# Patient Record
Sex: MALE | Race: WHITE | NOT HISPANIC OR LATINO | Employment: OTHER | URBAN - METROPOLITAN AREA
[De-identification: names, ages, dates, MRNs, and addresses within clinical notes are randomized per-mention and may not be internally consistent; named-entity substitution may affect disease eponyms.]

---

## 2018-04-18 ENCOUNTER — TELEPHONE (OUTPATIENT)
Dept: SURGERY | Facility: CLINIC | Age: 68
End: 2018-04-18

## 2018-04-18 NOTE — TELEPHONE ENCOUNTER
----- Message from Adalgisa Ramos sent at 4/18/2018 12:31 PM CDT -----  Contact: Sim pt wife#[phone]  She states that pt normally have to do a clean out for appts. I did inform her that prep was not required but she wants to speak with nurse about it. Please call

## 2018-04-20 DIAGNOSIS — Z86.010 HISTORY OF COLON POLYPS: Primary | ICD-10-CM

## 2018-04-24 ENCOUNTER — TELEPHONE (OUTPATIENT)
Dept: ENDOSCOPY | Facility: HOSPITAL | Age: 68
End: 2018-04-24

## 2018-04-24 RX ORDER — AMLODIPINE AND BENAZEPRIL HYDROCHLORIDE 10; 40 MG/1; MG/1
1 CAPSULE ORAL DAILY
COMMUNITY
End: 2019-06-06 | Stop reason: SDUPTHER

## 2018-04-24 RX ORDER — ATORVASTATIN CALCIUM 20 MG/1
20 TABLET, FILM COATED ORAL NIGHTLY
COMMUNITY
End: 2019-06-06 | Stop reason: SDUPTHER

## 2018-04-24 RX ORDER — UBIDECARENONE 30 MG
30 CAPSULE ORAL NIGHTLY
COMMUNITY
End: 2020-01-02

## 2018-04-24 RX ORDER — ASPIRIN 81 MG/1
81 TABLET ORAL DAILY
COMMUNITY
End: 2019-06-06 | Stop reason: SDUPTHER

## 2018-05-02 ENCOUNTER — TELEPHONE (OUTPATIENT)
Dept: SURGERY | Facility: CLINIC | Age: 68
End: 2018-05-02

## 2018-05-02 NOTE — TELEPHONE ENCOUNTER
Spoke with patient. States has pasty stools so good hygiene is difficult. Encouraged high fiber diet to promote  formed stools.  Instructed to call back in one week with an update. Colonoscopy scheduled in June 2018.

## 2018-05-02 NOTE — TELEPHONE ENCOUNTER
----- Message from Vijaya Boudreaux sent at 5/2/2018  1:40 PM CDT -----  Contact: Self - [phone]  Needs Medical Advice    Who Called: Pt   Symptoms (please be specific):  Contentiously wiping after using the restroom- rectum area raw from all the wiping- soft pasty stool    How long has patient had these symptoms: 5 or 6 days   Pharmacy name and phone # if needed:    Communication Preference Contact pt at [phone]  Additional Information:

## 2018-06-21 ENCOUNTER — ANESTHESIA EVENT (OUTPATIENT)
Dept: ENDOSCOPY | Facility: HOSPITAL | Age: 68
End: 2018-06-21
Payer: COMMERCIAL

## 2018-06-21 ENCOUNTER — ANESTHESIA (OUTPATIENT)
Dept: ENDOSCOPY | Facility: HOSPITAL | Age: 68
End: 2018-06-21
Payer: COMMERCIAL

## 2018-06-21 ENCOUNTER — SURGERY (OUTPATIENT)
Age: 68
End: 2018-06-21

## 2018-06-21 ENCOUNTER — HOSPITAL ENCOUNTER (OUTPATIENT)
Facility: HOSPITAL | Age: 68
Discharge: HOME | End: 2018-06-21
Attending: COLON & RECTAL SURGERY | Admitting: COLON & RECTAL SURGERY
Payer: COMMERCIAL

## 2018-06-21 VITALS
BODY MASS INDEX: 29.35 KG/M2 | TEMPERATURE: 99 F | RESPIRATION RATE: 17 BRPM | OXYGEN SATURATION: 98 % | WEIGHT: 205 LBS | DIASTOLIC BLOOD PRESSURE: 88 MMHG | HEIGHT: 70 IN | SYSTOLIC BLOOD PRESSURE: 123 MMHG | HEART RATE: 69 BPM

## 2018-06-21 DIAGNOSIS — Z12.11 SCREENING FOR COLON CANCER: ICD-10-CM

## 2018-06-21 PROCEDURE — 37000009 HC ANESTHESIA EA ADD 15 MINS: Performed by: COLON & RECTAL SURGERY

## 2018-06-21 PROCEDURE — G0105 COLORECTAL SCRN; HI RISK IND: HCPCS | Performed by: COLON & RECTAL SURGERY

## 2018-06-21 PROCEDURE — 25000003 PHARM REV CODE 250: Performed by: NURSE ANESTHETIST, CERTIFIED REGISTERED

## 2018-06-21 PROCEDURE — 63600175 PHARM REV CODE 636 W HCPCS: Performed by: NURSE ANESTHETIST, CERTIFIED REGISTERED

## 2018-06-21 PROCEDURE — E9220 PRA ENDO ANESTHESIA: HCPCS | Mod: ,,, | Performed by: NURSE ANESTHETIST, CERTIFIED REGISTERED

## 2018-06-21 PROCEDURE — G0105 COLORECTAL SCRN; HI RISK IND: HCPCS | Mod: ,,, | Performed by: COLON & RECTAL SURGERY

## 2018-06-21 PROCEDURE — 37000008 HC ANESTHESIA 1ST 15 MINUTES: Performed by: COLON & RECTAL SURGERY

## 2018-06-21 PROCEDURE — 25000003 PHARM REV CODE 250: Performed by: NURSE PRACTITIONER

## 2018-06-21 RX ORDER — LIDOCAINE HCL/PF 100 MG/5ML
SYRINGE (ML) INTRAVENOUS
Status: DISCONTINUED | OUTPATIENT
Start: 2018-06-21 | End: 2018-06-21

## 2018-06-21 RX ORDER — PROPOFOL 10 MG/ML
VIAL (ML) INTRAVENOUS
Status: DISCONTINUED | OUTPATIENT
Start: 2018-06-21 | End: 2018-06-21

## 2018-06-21 RX ORDER — SODIUM CHLORIDE 9 MG/ML
INJECTION, SOLUTION INTRAVENOUS CONTINUOUS
Status: DISCONTINUED | OUTPATIENT
Start: 2018-06-21 | End: 2018-06-21 | Stop reason: HOSPADM

## 2018-06-21 RX ORDER — GLYCOPYRROLATE 0.2 MG/ML
INJECTION INTRAMUSCULAR; INTRAVENOUS
Status: DISCONTINUED | OUTPATIENT
Start: 2018-06-21 | End: 2018-06-21

## 2018-06-21 RX ORDER — PROPOFOL 10 MG/ML
VIAL (ML) INTRAVENOUS CONTINUOUS PRN
Status: DISCONTINUED | OUTPATIENT
Start: 2018-06-21 | End: 2018-06-21

## 2018-06-21 RX ADMIN — GLYCOPYRROLATE 0.2 MG: 0.2 INJECTION, SOLUTION INTRAMUSCULAR; INTRAVENOUS at 09:06

## 2018-06-21 RX ADMIN — PROPOFOL 150 MCG/KG/MIN: 10 INJECTION, EMULSION INTRAVENOUS at 09:06

## 2018-06-21 RX ADMIN — PROPOFOL 20 MG: 10 INJECTION, EMULSION INTRAVENOUS at 09:06

## 2018-06-21 RX ADMIN — SODIUM CHLORIDE: 0.9 INJECTION, SOLUTION INTRAVENOUS at 09:06

## 2018-06-21 RX ADMIN — PROPOFOL 70 MG: 10 INJECTION, EMULSION INTRAVENOUS at 09:06

## 2018-06-21 RX ADMIN — LIDOCAINE HYDROCHLORIDE 40 MG: 20 INJECTION, SOLUTION INTRAVENOUS at 09:06

## 2018-06-21 NOTE — ANESTHESIA POSTPROCEDURE EVALUATION
"Anesthesia Post Evaluation    Patient: Rikki Mello    Procedure(s) Performed: Procedure(s) (LRB):  COLONOSCOPY (N/A)    Final Anesthesia Type: general  Patient location during evaluation: GI PACU  Patient participation: Yes- Able to Participate  Level of consciousness: awake and alert  Post-procedure vital signs: reviewed and stable  Pain management: adequate  Airway patency: patent  PONV status at discharge: No PONV  Anesthetic complications: no      Cardiovascular status: blood pressure returned to baseline  Respiratory status: unassisted  Hydration status: euvolemic  Follow-up not needed.        Visit Vitals  /88   Pulse 69   Temp 37.1 °C (98.7 °F)   Resp 17   Ht 5' 10" (1.778 m)   Wt 93 kg (205 lb)   SpO2 98%   BMI 29.41 kg/m²       Pain/Tawny Score: Pain Assessment Performed: Yes (6/21/2018 10:07 AM)  Presence of Pain: denies (6/21/2018  9:29 AM)      "

## 2018-06-21 NOTE — PROVATION PATIENT INSTRUCTIONS
Discharge Summary/Instructions after an Endoscopic Procedure  Patient Name: Rikki Mello  Patient MRN: [id number]  Patient YOB: 1950 Thursday, June 21, 2018  Medhat Ponce MD  RESTRICTIONS:  During your procedure today, you received medications for sedation.  These   medications may affect your judgment, balance and coordination.  Therefore,   for 24 hours, you have the following restrictions:   - DO NOT drive a car, operate machinery, make legal/financial decisions,   sign important papers or drink alcohol.    ACTIVITY:  Today: no heavy lifting, straining or running due to procedural   sedation/anesthesia.  The following day: return to full activity including work.  DIET:  Eat and drink normally unless instructed otherwise.     TREATMENT FOR COMMON SIDE EFFECTS:  - Mild abdominal pain, nausea, belching, bloating or excessive gas:  rest,   eat lightly and use a heating pad.  - Sore Throat: treat with throat lozenges and/or gargle with warm salt   water.  - Because air was used during the procedure, expelling large amounts of air   from your rectum or belching is normal.  - If a bowel prep was taken, you may not have a bowel movement for 1-3 days.    This is normal.  SYMPTOMS TO WATCH FOR AND REPORT TO YOUR PHYSICIAN:  1. Abdominal pain or bloating, other than gas cramps.  2. Chest pain.  3. Back pain.  4. Signs of infection such as: chills or fever occurring within 24 hours   after the procedure.  5. Rectal bleeding, which would show as bright red, maroon, or black stools.   (A tablespoon of blood from the rectum is not serious, especially if   hemorrhoids are present.)  6. Vomiting.  7. Weakness or dizziness.  GO DIRECTLY TO THE NEAREST EMERGENCY ROOM IF YOU HAVE ANY OF THE FOLLOWING:      Difficulty breathing              Chills and/or fever over 101 F   Persistent vomiting and/or vomiting blood   Severe abdominal pain   Severe chest pain   Black, tarry stools   Bleeding- more than one  tablespoon   Any other symptom or condition that you feel may need urgent attention  Your doctor recommends these additional instructions:  If any biopsies were taken, your doctors clinic will contact you in 1 to 2   weeks with any results.  - Patient has a contact number available for emergencies.  The signs and   symptoms of potential delayed complications were discussed with the   patient.  Return to normal activities tomorrow.  Written discharge   instructions were provided to the patient.   - Resume regular diet indefinitely.   - Continue present medications.   - Repeat colonoscopy in 5 years for surveillance.   - Discharge patient to home (ambulatory).   - Schedule TAE/TAMIS in OR  For questions, problems or results please call your physician - Medhat Ponce MD at Work:  [phone].  [facility], EMERGENCY ROOM PHONE NUMBER: [phone]  IF A COMPLICATION OR EMERGENCY SITUATION ARISES AND YOU ARE UNABLE TO REACH   YOUR PHYSICIAN - GO DIRECTLY TO THE EMERGENCY ROOM.  Medhat Ponce MD  6/21/2018 10:07:42 AM  This report has been verified and signed electronically.  [employer]ATION

## 2018-06-21 NOTE — TRANSFER OF CARE
"Anesthesia Transfer of Care Note    Patient: Rikki Mello    Procedure(s) Performed: Procedure(s) (LRB):  COLONOSCOPY (N/A)    Patient location: PACU    Anesthesia Type: general    Transport from OR: Transported from OR on room air with adequate spontaneous ventilation    Post pain: adequate analgesia    Post assessment: no apparent anesthetic complications and tolerated procedure well    Post vital signs: stable    Level of consciousness: sedated    Nausea/Vomiting: no nausea/vomiting    Complications: none    Transfer of care protocol was followed      Last vitals:   Visit Vitals  BP (!) 150/72 (BP Location: Left arm, Patient Position: Sitting)   Pulse 82   Temp 36.9 °C (98.4 °F) (Temporal)   Resp 18   Ht 5' 10" (1.778 m)   Wt 93 kg (205 lb)   SpO2 98%   BMI 29.41 kg/m²     "

## 2018-06-21 NOTE — H&P
COLONOSCOPY HISTORY AND PE    Procedure : Colonoscopy      INDICATIONS: family history of colon polyps    Family Hx of CRC: none    Last Colonoscopy:  2013  Findings: normal       Past Medical History:   Diagnosis Date    BPH (benign prostatic hyperplasia)     Inguinal hernia unilateral, non-recurrent      Sedation Problems: NO  No family history on file.  Fam Hx of Sedation Problems: NO  Social History     Social History    Marital status:      Spouse name: N/A    Number of children: N/A    Years of education: N/A     Occupational History    Not on file.     Social History Main Topics    Smoking status: Never Smoker    Smokeless tobacco: Not on file    Alcohol use Yes      Comment: once a month    Drug use: No    Sexual activity: Not on file     Other Topics Concern    Not on file     Social History Narrative    No narrative on file       Review of Systems - Negative except   Respiratory ROS: no dyspnea  Cardiovascular ROS: no exertional chest pain  Gastrointestinal ROS: NO abdominal discomfort,  NO rectal bleeding  Musculoskeletal ROS: no muscular pain  Neurological ROS: no recent stroke    Physical Exam:  There were no vitals taken for this visit.  General: no distress  Head: normocephalic  Mallampati Score   Neck: supple, symmetrical, trachea midline  Lungs:  normal respiratory effort  Heart: no murmur  Abdomen: soft, non-tender non-distented; bowel sounds normal; no masses,  no organomegaly  Extremities: no cyanosis or edema, or clubbing    ASA:  II    PLAN  COLONOSCOPY.    SedationPlan :MAC    The details of the procedure, the possible need for biopsy or polypectomy and the potential risks including bleeding, perforation, missed polyps were discussed in detail.

## 2018-06-21 NOTE — ANESTHESIA PREPROCEDURE EVALUATION
06/21/2018  Rikki Mello is a 68 y.o., male.  Past Medical History:   Diagnosis Date    BPH (benign prostatic hyperplasia)     Inguinal hernia unilateral, non-recurrent      Past Surgical History:   Procedure Laterality Date    HERNIA REPAIR      TONSILLECTOMY      TUMOR EXCISION      rectum         Anesthesia Evaluation    I have reviewed the Patient Summary Reports.    I have reviewed the Nursing Notes.   I have reviewed the Medications.     Review of Systems  Anesthesia Hx:  No problems with previous Anesthesia  Neg history of prior surgery. Denies Family Hx of Anesthesia complications.   Denies Personal Hx of Anesthesia complications.   Hematology/Oncology:  Hematology Normal   Oncology Normal     EENT/Dental:EENT/Dental Normal   Cardiovascular:   Exercise tolerance: good Hypertension, well controlled    Pulmonary:  Pulmonary Normal    Renal/:  Renal/ Normal     Hepatic/GI:  Hepatic/GI Normal Bowel Prep.    Musculoskeletal:  Musculoskeletal Normal    Neurological:  Neurology Normal    Endocrine:  Endocrine Normal    Dermatological:  Skin Normal    Psych:  Psychiatric Normal           Physical Exam  General:  Well nourished    Airway/Jaw/Neck:  Airway Findings: Mouth Opening: Normal Tongue: Normal  Mallampati: II  TM Distance: Normal, at least 6 cm        Eyes/Ears/Nose:  EYES/EARS/NOSE FINDINGS: Normal   Dental:  Dental Findings:   Chest/Lungs:  Chest/Lungs Clear    Heart/Vascular:  Heart Findings: Normal Heart murmur: negative Vascular Findings: Normal    Abdomen:  Abdomen Findings: Normal    Musculoskeletal:  Musculoskeletal Findings: Normal   Skin:  Skin Findings: Normal    Mental Status:  Mental Status Findings: Normal        Anesthesia Plan  Type of Anesthesia, risks & benefits discussed:  Anesthesia Type:  general  Patient's Preference: General  Intra-op Monitoring Plan: standard ASA  monitors  Intra-op Monitoring Plan Comments:   Post Op Pain Control Plan:   Post Op Pain Control Plan Comments:   Induction:   IV  Beta Blocker:  Patient is not currently on a Beta-Blocker (No further documentation required).       Informed Consent: Patient understands risks and agrees with Anesthesia plan.  Questions answered. Anesthesia consent signed with patient.  ASA Score: 2     Day of Surgery Review of History & Physical: I have interviewed and examined the patient. I have reviewed the patient's H&P dated:  There are no significant changes.  H&P update referred to the surgeon.         Ready For Surgery From Anesthesia Perspective.

## 2018-06-25 ENCOUNTER — TELEPHONE (OUTPATIENT)
Dept: SURGERY | Facility: CLINIC | Age: 68
End: 2018-06-25

## 2018-06-25 NOTE — TELEPHONE ENCOUNTER
Spoke with patient's wife and informed her that the patient's surgery is scheduled for 7/16 and his appointment is scheduled for 7/2.

## 2018-06-25 NOTE — TELEPHONE ENCOUNTER
----- Message from Danita Neff sent at 6/25/2018  3:41 PM CDT -----  Contact: Pt:[phone]  .Patient Returning Call from [facility]    Who Left Message for Patient:Candy De Los Santos RN  Communication Preference:Pt:[phone]  Additional Information:

## 2018-06-26 DIAGNOSIS — K62.1 RECTAL POLYP: Primary | ICD-10-CM

## 2018-06-28 ENCOUNTER — TELEPHONE (OUTPATIENT)
Dept: ENDOSCOPY | Facility: HOSPITAL | Age: 68
End: 2018-06-28

## 2018-06-28 ENCOUNTER — ANESTHESIA EVENT (OUTPATIENT)
Dept: SURGERY | Facility: HOSPITAL | Age: 68
End: 2018-06-28
Payer: COMMERCIAL

## 2018-06-28 DIAGNOSIS — Z01.818 PREOP TESTING: Primary | ICD-10-CM

## 2018-06-28 NOTE — ANESTHESIA PREPROCEDURE EVALUATION
" Anesthesia Assessment: Preoperative EQUATION    Planned Procedure: Procedure(s) (LRB):  TRANSANAL MINIMALLY INVASIVE SURGERY (TAMIS) (N/A)  Requested Anesthesia Type:General  Surgeon: Medhat Ponce MD  Service: Colon and Rectal  Known or anticipated Date of Surgery:7/17/2018    Surgeon notes: reviewed and Rectal polyp  Previous anesthesia records:6/21/18-Colonoscopy-General-no apparent anesthetic complications and tolerated procedure well    Last PCP note: within 1 month , outside [facility] , focused visit   Subspecialty notes: Cardiology: General, Urology  Tests already available:  No recent tests.      Plan:    Testing:  EKG & H/H      Patient  has previously scheduled Medical Appointment:Appointment on 7/2/18, prior to surgery date.    Navigation: Tests Scheduled. EKG & Lab-H&H on 7/2/18 @ 11:30a & 11:55a             Consults scheduled.Requested OS "Clearance" fro NP/PCP-[facility] in [place]-PENDING Callback-Update:Spoke to DAVIDE Miller-will fax "Clearance" ASAP-PENDING             Results will be tracked by Preop Clinic.   Plan reviewed with .              Ghazala Murphy RN  6/28/18    Addendum:OS "Clearance" still pending as of 7/2/18. Ghazala Murphy RN  7/2/18               Addendum:Received  OS NP/PCP-ONEIL Miller-[place]  "Clearance" & in [facility] in [facility].    [facility]  Anesthesia Pre-Operative Evaluation         Patient Name: Rikki Mello  YOB: 1950  MRN: [id number]    SUBJECTIVE:     Pre-operative evaluation for Procedure(s) (LRB):  TRANSANAL MINIMALLY INVASIVE SURGERY (TAMIS) (N/A)     07/09/2018    Rikki Mello is a 68 y.o. male w/ a significant PMHx of HTN presenting with a rectal villous tumor. 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    Patient now presents for the above procedure(s).      LDA: None documented.    Prev " airway: None documented.    Drips: None documented.    Patient Active Problem List   Diagnosis    Screening for colon cancer       Review of patient's allergies indicates:   Allergen Reactions    Codeine Itching       No current facility-administered medications for this encounter.     Current Outpatient Prescriptions:     amlodipine-benazepril (LOTREL) 10-40 mg per capsule, Take 1 capsule by mouth once daily., Disp: , Rfl:     aspirin (ECOTRIN) 81 MG EC tablet, Take 81 mg by mouth once daily. , Disp: , Rfl:     atorvastatin (LIPITOR) 20 MG tablet, Take 20 mg by mouth every evening., Disp: , Rfl:     co-enzyme Q-10 30 mg capsule, Take 30 mg by mouth every evening., Disp: , Rfl:     metroNIDAZOLE (FLAGYL) 500 MG tablet, On the day before your surgery, take 1 tablet (500 mg) by mouth at 1pm, 2pm and 11pm., Disp: 3 tablet, Rfl: 0    neomycin (MYCIFRADIN) 500 mg Tab, On the day before your surgery, take 2 tablets (1,000 mg) by mouth at 1pm, 2pm and 11pm., Disp: 6 tablet, Rfl: 0    No current facility-administered medications on file prior to encounter.      Current Outpatient Prescriptions on File Prior to Encounter   Medication Sig Dispense Refill    amlodipine-benazepril (LOTREL) 10-40 mg per capsule Take 1 capsule by mouth once daily.      aspirin (ECOTRIN) 81 MG EC tablet Take 81 mg by mouth once daily.       atorvastatin (LIPITOR) 20 MG tablet Take 20 mg by mouth every evening.      co-enzyme Q-10 30 mg capsule Take 30 mg by mouth every evening.         Past Surgical History:   Procedure Laterality Date    COLONOSCOPY N/A 6/21/2018    Procedure: COLONOSCOPY;  Surgeon: Medhat Ponce MD;  Location: [address]);  Service: Endoscopy;  Laterality: N/A;  miralax prep (past preps miralax can not tolerate other preps)    HERNIA REPAIR      TONSILLECTOMY      TUMOR EXCISION      rectum       Social History     Social History    Marital status:      Spouse name: N/A    Number of  children: N/A    Years of education: N/A     Occupational History    Not on file.     Social History Main Topics    Smoking status: Never Smoker    Smokeless tobacco: Never Used    Alcohol use Yes      Comment: once a month    Drug use: No    Sexual activity: Not on file     Other Topics Concern    Not on file     Social History Narrative    No narrative on file       OBJECTIVE:     Vital Signs Range (Last 24H):         Significant Labs:  Lab Results   Component Value Date    WBC 6.48 02/17/2012    HGB 15.3 07/02/2018    HCT 44.0 07/02/2018     02/17/2012     02/17/2012    K 4.0 02/17/2012     02/17/2012    CREATININE 0.9 02/17/2012    BUN 17 02/17/2012    CO2 25 02/17/2012    INR 0.9 07/09/2007       Diagnostic Studies: No relevant studies.    EKG:   Vent. Rate : 049 BPM     Atrial Rate : 049 BPM     P-R Int : 134 ms          QRS Dur : 092 ms      QT Int : 458 ms       P-R-T Axes : 074 028 038 degrees     QTc Int : 413 ms    Marked sinus bradycardia  Otherwise normal ECG  When compared with ECG of 17-FEB-2012 11:02,  No significant change was found    Confirmed by Delroy Dodson MD ([id number]) on 7/2/2018 12:13:31 PM    2D ECHO:  No results found for this or any previous visit.       ASSESSMENT/PLAN:         Anesthesia Evaluation    I have reviewed the Patient Summary Reports.     I have reviewed the Medications.     Review of Systems  Anesthesia Hx:  No problems with previous Anesthesia  Denies Family Hx of Anesthesia complications.   Denies Personal Hx of Anesthesia complications.   Social:  Social Alcohol Use, Non-Smoker    EENT/Dental:   Wears glasses/sinus issues/poor dental-chipped teeth   Cardiovascular:   Exercise tolerance: good Hypertension, well controlled Walking daily/ gardening/mows lawn   Hepatic/GI:   Rectal polyp   Dermatological:   Very dry skin       Physical Exam  General:  Well nourished    Airway/Jaw/Neck:  Airway Findings: Mouth Opening: Normal Tongue: Normal  General  Airway Assessment: Adult  Mallampati: II  Improves to I with phonation.  TM Distance: Normal, at least 6 cm  Jaw/Neck Findings:  Neck ROM: Normal ROM     Eyes/Ears/Nose:  EYES/EARS/NOSE FINDINGS: Normal   Dental:  Dental Findings:    Chest/Lungs:  Chest/Lungs Findings: Clear to auscultation, Normal Respiratory Rate     Heart/Vascular:  Heart Findings: Rate: Normal  Rhythm: Regular Rhythm     Abdomen:  Abdomen Findings: Normal    Musculoskeletal:  Musculoskeletal Findings: Normal   Skin:  Skin Findings: Normal    Mental Status:  Mental Status Findings:  Cooperative, Alert and Oriented     Ghazala Murphy RN  6/28/18    Anesthesia Plan  Type of Anesthesia, risks & benefits discussed:  Anesthesia Type:  general, MAC  Patient's Preference:   Intra-op Monitoring Plan: standard ASA monitors  Intra-op Monitoring Plan Comments:   Post Op Pain Control Plan: multimodal analgesia  Post Op Pain Control Plan Comments:   Induction:   IV  Beta Blocker:  Patient is not currently on a Beta-Blocker (No further documentation required).       Informed Consent: Patient understands risks and agrees with Anesthesia plan.  Questions answered. Anesthesia consent signed with patient.  ASA Score: 2     Day of Surgery Review of History & Physical: I have interviewed and examined the patient. I have reviewed the patient's H&P dated:            Ready For Surgery From Anesthesia Perspective.

## 2018-06-28 NOTE — PRE ADMISSION SCREENING
"Anesthesia Assessment: Preoperative EQUATION    Planned Procedure: Procedure(s) (LRB):  TRANSANAL MINIMALLY INVASIVE SURGERY (TAMIS) (N/A)  Requested Anesthesia Type:General  Surgeon: Medhat Ponce MD  Service: Colon and Rectal  Known or anticipated Date of Surgery:7/17/2018    Surgeon notes: reviewed and Rectal polyp  Previous anesthesia records:6/21/18-Colonoscopy-General-no apparent anesthetic complications and tolerated procedure well    Last PCP note: within 1 month , outside [facility] , focused visit   Subspecialty notes: Cardiology: General, Urology  Tests already available:  No recent tests.      Plan:    Testing:  EKG & H/H      Patient  has previously scheduled Medical Appointment:Appointment on 7/2/18, prior to surgery date.    Navigation: Tests Scheduled. EKG & Lab-H&H on 7/2/18 @ 11:30a & 11:55a             Consults scheduled.Requested OS "Clearance" fro NP[facility]P-[facility] [place], [place]-PENDING Callback-Update:Spoke to DAVIDE Miller-will fax "Clearance" ASAP-PENDING             Results will be tracked by Preop Clinic.   Plan reviewed with .              Ghazala Murphy, RN  6/28/18                "

## 2018-07-02 ENCOUNTER — HOSPITAL ENCOUNTER (OUTPATIENT)
Dept: CARDIOLOGY | Facility: CLINIC | Age: 68
Discharge: HOME | End: 2018-07-02
Payer: COMMERCIAL

## 2018-07-02 ENCOUNTER — LAB VISIT (OUTPATIENT)
Dept: LAB | Facility: HOSPITAL | Age: 68
End: 2018-07-02
Attending: ANESTHESIOLOGY
Payer: COMMERCIAL

## 2018-07-02 ENCOUNTER — OFFICE VISIT (OUTPATIENT)
Dept: SURGERY | Facility: CLINIC | Age: 68
End: 2018-07-02
Payer: COMMERCIAL

## 2018-07-02 VITALS
HEART RATE: 53 BPM | HEIGHT: 70 IN | WEIGHT: 205.25 LBS | DIASTOLIC BLOOD PRESSURE: 76 MMHG | SYSTOLIC BLOOD PRESSURE: 147 MMHG | BODY MASS INDEX: 29.38 KG/M2

## 2018-07-02 DIAGNOSIS — Z01.818 PREOP TESTING: ICD-10-CM

## 2018-07-02 DIAGNOSIS — K62.1 RECTAL POLYP: Primary | ICD-10-CM

## 2018-07-02 LAB
HCT VFR BLD AUTO: 44 %
HGB BLD-MCNC: 15.3 G/DL

## 2018-07-02 PROCEDURE — 85018 HEMOGLOBIN: CPT

## 2018-07-02 PROCEDURE — 93000 ELECTROCARDIOGRAM COMPLETE: CPT | Mod: S$GLB,,, | Performed by: INTERNAL MEDICINE

## 2018-07-02 PROCEDURE — 99999 PR PBB SHADOW E&M-EST. PATIENT-LVL II: CPT | Mod: PBBFAC,,, | Performed by: COLON & RECTAL SURGERY

## 2018-07-02 PROCEDURE — 99213 OFFICE O/P EST LOW 20 MIN: CPT | Mod: S$GLB,,, | Performed by: COLON & RECTAL SURGERY

## 2018-07-02 PROCEDURE — 85014 HEMATOCRIT: CPT

## 2018-07-02 PROCEDURE — 36415 COLL VENOUS BLD VENIPUNCTURE: CPT

## 2018-07-02 RX ORDER — METRONIDAZOLE 500 MG/1
TABLET ORAL
Qty: 3 TABLET | Refills: 0 | Status: SHIPPED | OUTPATIENT
Start: 2018-07-02 | End: 2018-07-30

## 2018-07-02 RX ORDER — NEOMYCIN SULFATE 500 MG/1
TABLET ORAL
Qty: 6 TABLET | Refills: 0 | Status: SHIPPED | OUTPATIENT
Start: 2018-07-02 | End: 2018-07-30

## 2018-07-02 NOTE — PROGRESS NOTES
History & Physical    SUBJECTIVE:     History of Present Illness:  Patient is a 68 y.o. male presents with a rectal villous tumor.  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    Chief Complaint   Patient presents with    Rectal polyp       Review of patient's allergies indicates:   Allergen Reactions    Codeine Itching       Current Outpatient Prescriptions   Medication Sig Dispense Refill    amlodipine-benazepril (LOTREL) 10-40 mg per capsule Take 1 capsule by mouth once daily.      aspirin (ECOTRIN) 81 MG EC tablet Take 81 mg by mouth once daily.       atorvastatin (LIPITOR) 20 MG tablet Take 20 mg by mouth every evening.      co-enzyme Q-10 30 mg capsule Take 30 mg by mouth every evening.      metroNIDAZOLE (FLAGYL) 500 MG tablet On the day before your surgery, take 1 tablet (500 mg) by mouth at 1pm, 2pm and 11pm. 3 tablet 0    neomycin (MYCIFRADIN) 500 mg Tab On the day before your surgery, take 2 tablets (1,000 mg) by mouth at 1pm, 2pm and 11pm. 6 tablet 0     No current facility-administered medications for this visit.        Past Medical History:   Diagnosis Date    BPH (benign prostatic hyperplasia)     General anesthetics causing adverse effect in therapeutic use     last awhile for orientation to fully comeback    Hypertension     Inguinal hernia unilateral, non-recurrent      Past Surgical History:   Procedure Laterality Date    COLONOSCOPY N/A 6/21/2018    Procedure: COLONOSCOPY;  Surgeon: Medhat Ponce MD;  Location: [address];  Service: Endoscopy;  Laterality: N/A;  miralax prep (past preps miralax can not tolerate other preps)    HERNIA REPAIR      TONSILLECTOMY      TUMOR EXCISION      rectum     No family history on file.  Social History   Substance Use Topics    Smoking status: Never Smoker    Smokeless tobacco: Never Used    Alcohol use Yes      Comment:  "once a month        Review of Systems:  Review of Systems   Constitutional: Negative for chills and fever.   Respiratory: Negative for cough and shortness of breath.    Cardiovascular: Negative for chest pain and palpitations.   Gastrointestinal: Negative for nausea and vomiting.   Genitourinary: Negative for dysuria and urgency.   Neurological: Negative for seizures and numbness.       OBJECTIVE:     Vital Signs (Most Recent)  Pulse: (!) 53 (07/02/18 0929)  BP: (!) 147/76 (07/02/18 0929)  5' 10" (1.778 m)  93.1 kg (205 lb 4 oz)     Physical Exam:  Physical Exam   Constitutional: He is oriented to person, place, and time. He appears well-developed and well-nourished.   Eyes: Conjunctivae and EOM are normal.   Pulmonary/Chest: Effort normal. No respiratory distress.   Genitourinary:   Genitourinary Comments: 30 mm flat villous lesion posterior in the rectum starting at the dentate line. Normal tone.   Musculoskeletal: Normal range of motion. He exhibits no edema.   Neurological: He is alert and oriented to person, place, and time.   Skin: Skin is warm and dry.       Laboratory  none    Diagnostic Results:  None    ASSESSMENT/PLAN:     Rectal villous tumor    PLAN:Plan     Transanal excision, possible TAMIS  I have explained the risks, benefits, and alternatives of the procedure in detail.  The patient voices understanding and all questions have been answered. The patient agrees to proceed as planned.         "

## 2018-07-09 ENCOUNTER — TELEPHONE (OUTPATIENT)
Dept: SURGERY | Facility: CLINIC | Age: 68
End: 2018-07-09

## 2018-07-10 ENCOUNTER — HOSPITAL ENCOUNTER (OUTPATIENT)
Facility: HOSPITAL | Age: 68
Discharge: HOME | End: 2018-07-11
Attending: COLON & RECTAL SURGERY | Admitting: COLON & RECTAL SURGERY
Payer: COMMERCIAL

## 2018-07-10 DIAGNOSIS — K62.1 RECTAL POLYP: Primary | ICD-10-CM

## 2018-07-10 LAB
ABO GROUP BLD: NORMAL
BLD GP AB SCN CELLS X3 SERPL QL: NORMAL
RH BLD: NORMAL

## 2018-07-10 PROCEDURE — 86850 RBC ANTIBODY SCREEN: CPT

## 2018-07-10 PROCEDURE — 71000039 HC RECOVERY, EACH ADD'L HOUR: Performed by: COLON & RECTAL SURGERY

## 2018-07-10 PROCEDURE — 36000707: Performed by: COLON & RECTAL SURGERY

## 2018-07-10 PROCEDURE — 88309 TISSUE EXAM BY PATHOLOGIST: CPT | Performed by: PATHOLOGY

## 2018-07-10 PROCEDURE — 86901 BLOOD TYPING SEROLOGIC RH(D): CPT

## 2018-07-10 PROCEDURE — 71000033 HC RECOVERY, INTIAL HOUR: Performed by: COLON & RECTAL SURGERY

## 2018-07-10 PROCEDURE — 25000003 PHARM REV CODE 250: Performed by: NURSE PRACTITIONER

## 2018-07-10 PROCEDURE — 37000009 HC ANESTHESIA EA ADD 15 MINS: Performed by: COLON & RECTAL SURGERY

## 2018-07-10 PROCEDURE — 11000001 HC ACUTE MED/SURG PRIVATE ROOM

## 2018-07-10 PROCEDURE — 86900 BLOOD TYPING SEROLOGIC ABO: CPT

## 2018-07-10 PROCEDURE — 88309 TISSUE EXAM BY PATHOLOGIST: CPT | Mod: 26,,, | Performed by: PATHOLOGY

## 2018-07-10 PROCEDURE — 27201423 OPTIME MED/SURG SUP & DEVICES STERILE SUPPLY: Performed by: COLON & RECTAL SURGERY

## 2018-07-10 PROCEDURE — 63600175 PHARM REV CODE 636 W HCPCS: Performed by: STUDENT IN AN ORGANIZED HEALTH CARE EDUCATION/TRAINING PROGRAM

## 2018-07-10 PROCEDURE — 37000008 HC ANESTHESIA 1ST 15 MINUTES: Performed by: COLON & RECTAL SURGERY

## 2018-07-10 PROCEDURE — 94761 N-INVAS EAR/PLS OXIMETRY MLT: CPT

## 2018-07-10 PROCEDURE — 94799 UNLISTED PULMONARY SVC/PX: CPT

## 2018-07-10 PROCEDURE — 27000221 HC OXYGEN, UP TO 24 HOURS

## 2018-07-10 PROCEDURE — 25000003 PHARM REV CODE 250: Performed by: STUDENT IN AN ORGANIZED HEALTH CARE EDUCATION/TRAINING PROGRAM

## 2018-07-10 PROCEDURE — 63600175 PHARM REV CODE 636 W HCPCS: Performed by: NURSE PRACTITIONER

## 2018-07-10 PROCEDURE — 25000003 PHARM REV CODE 250: Performed by: COLON & RECTAL SURGERY

## 2018-07-10 PROCEDURE — 36000706: Performed by: COLON & RECTAL SURGERY

## 2018-07-10 PROCEDURE — D9220A PRA ANESTHESIA: Mod: ,,, | Performed by: ANESTHESIOLOGY

## 2018-07-10 PROCEDURE — 45171 EXC RECT TUM TRANSANAL PART: CPT | Mod: ,,, | Performed by: COLON & RECTAL SURGERY

## 2018-07-10 RX ORDER — PROPOFOL 10 MG/ML
VIAL (ML) INTRAVENOUS
Status: DISCONTINUED | OUTPATIENT
Start: 2018-07-10 | End: 2018-07-10

## 2018-07-10 RX ORDER — ONDANSETRON 2 MG/ML
INJECTION INTRAMUSCULAR; INTRAVENOUS
Status: DISCONTINUED | OUTPATIENT
Start: 2018-07-10 | End: 2018-07-10

## 2018-07-10 RX ORDER — GLYCOPYRROLATE 0.2 MG/ML
INJECTION INTRAMUSCULAR; INTRAVENOUS
Status: DISCONTINUED | OUTPATIENT
Start: 2018-07-10 | End: 2018-07-10

## 2018-07-10 RX ORDER — DEXAMETHASONE SODIUM PHOSPHATE 4 MG/ML
INJECTION, SOLUTION INTRA-ARTICULAR; INTRALESIONAL; INTRAMUSCULAR; INTRAVENOUS; SOFT TISSUE
Status: DISCONTINUED | OUTPATIENT
Start: 2018-07-10 | End: 2018-07-10

## 2018-07-10 RX ORDER — LIDOCAINE HYDROCHLORIDE AND EPINEPHRINE 10; 10 MG/ML; UG/ML
INJECTION, SOLUTION INFILTRATION; PERINEURAL
Status: DISCONTINUED | OUTPATIENT
Start: 2018-07-10 | End: 2018-07-10 | Stop reason: HOSPADM

## 2018-07-10 RX ORDER — ATORVASTATIN CALCIUM 10 MG/1
20 TABLET, FILM COATED ORAL NIGHTLY
Status: DISCONTINUED | OUTPATIENT
Start: 2018-07-10 | End: 2018-07-11 | Stop reason: HOSPADM

## 2018-07-10 RX ORDER — AMLODIPINE AND BENAZEPRIL HYDROCHLORIDE 5; 20 MG/1; MG/1
2 CAPSULE ORAL DAILY
Status: DISCONTINUED | OUTPATIENT
Start: 2018-07-10 | End: 2018-07-11 | Stop reason: HOSPADM

## 2018-07-10 RX ORDER — MIDAZOLAM HYDROCHLORIDE 1 MG/ML
INJECTION, SOLUTION INTRAMUSCULAR; INTRAVENOUS
Status: DISCONTINUED | OUTPATIENT
Start: 2018-07-10 | End: 2018-07-10

## 2018-07-10 RX ORDER — CALCIUM CARBONATE 200(500)MG
1000 TABLET,CHEWABLE ORAL EVERY 8 HOURS PRN
Status: DISCONTINUED | OUTPATIENT
Start: 2018-07-10 | End: 2018-07-11 | Stop reason: HOSPADM

## 2018-07-10 RX ORDER — IBUPROFEN 600 MG/1
600 TABLET ORAL EVERY 6 HOURS PRN
Status: DISCONTINUED | OUTPATIENT
Start: 2018-07-10 | End: 2018-07-11 | Stop reason: HOSPADM

## 2018-07-10 RX ORDER — NEOSTIGMINE METHYLSULFATE 1 MG/ML
INJECTION, SOLUTION INTRAVENOUS
Status: DISCONTINUED | OUTPATIENT
Start: 2018-07-10 | End: 2018-07-10

## 2018-07-10 RX ORDER — NALOXONE HCL 0.4 MG/ML
0.02 VIAL (ML) INJECTION
Status: DISCONTINUED | OUTPATIENT
Start: 2018-07-10 | End: 2018-07-11 | Stop reason: HOSPADM

## 2018-07-10 RX ORDER — MUPIROCIN 20 MG/G
OINTMENT TOPICAL
Status: DISCONTINUED | OUTPATIENT
Start: 2018-07-10 | End: 2018-07-10

## 2018-07-10 RX ORDER — SIMETHICONE 80 MG
1 TABLET,CHEWABLE ORAL EVERY 8 HOURS PRN
Status: DISCONTINUED | OUTPATIENT
Start: 2018-07-10 | End: 2018-07-11 | Stop reason: HOSPADM

## 2018-07-10 RX ORDER — OXYCODONE HYDROCHLORIDE 5 MG/1
5 TABLET ORAL EVERY 4 HOURS PRN
Status: DISCONTINUED | OUTPATIENT
Start: 2018-07-10 | End: 2018-07-11 | Stop reason: HOSPADM

## 2018-07-10 RX ORDER — ROCURONIUM BROMIDE 10 MG/ML
INJECTION, SOLUTION INTRAVENOUS
Status: DISCONTINUED | OUTPATIENT
Start: 2018-07-10 | End: 2018-07-10

## 2018-07-10 RX ORDER — FENTANYL CITRATE 50 UG/ML
25 INJECTION, SOLUTION INTRAMUSCULAR; INTRAVENOUS EVERY 5 MIN PRN
Status: DISCONTINUED | OUTPATIENT
Start: 2018-07-10 | End: 2018-07-10 | Stop reason: HOSPADM

## 2018-07-10 RX ORDER — SODIUM CHLORIDE 0.9 % (FLUSH) 0.9 %
3 SYRINGE (ML) INJECTION
Status: DISCONTINUED | OUTPATIENT
Start: 2018-07-10 | End: 2018-07-10

## 2018-07-10 RX ORDER — ACETAMINOPHEN 10 MG/ML
1000 INJECTION, SOLUTION INTRAVENOUS
Status: COMPLETED | OUTPATIENT
Start: 2018-07-10 | End: 2018-07-10

## 2018-07-10 RX ORDER — AMLODIPINE AND BENAZEPRIL HYDROCHLORIDE 5; 10 MG/1; MG/1
1 CAPSULE ORAL DAILY
Status: DISCONTINUED | OUTPATIENT
Start: 2018-07-10 | End: 2018-07-10

## 2018-07-10 RX ORDER — DIPHENHYDRAMINE HCL 25 MG
25 CAPSULE ORAL EVERY 6 HOURS PRN
Status: DISCONTINUED | OUTPATIENT
Start: 2018-07-10 | End: 2018-07-11 | Stop reason: HOSPADM

## 2018-07-10 RX ORDER — FENTANYL CITRATE 50 UG/ML
INJECTION, SOLUTION INTRAMUSCULAR; INTRAVENOUS
Status: DISCONTINUED | OUTPATIENT
Start: 2018-07-10 | End: 2018-07-10

## 2018-07-10 RX ORDER — CEFAZOLIN SODIUM 1 G/3ML
INJECTION, POWDER, FOR SOLUTION INTRAMUSCULAR; INTRAVENOUS
Status: DISCONTINUED | OUTPATIENT
Start: 2018-07-10 | End: 2018-07-10

## 2018-07-10 RX ORDER — OXYCODONE HYDROCHLORIDE 5 MG/1
10 TABLET ORAL EVERY 4 HOURS PRN
Status: DISCONTINUED | OUTPATIENT
Start: 2018-07-10 | End: 2018-07-11 | Stop reason: HOSPADM

## 2018-07-10 RX ORDER — PROMETHAZINE HYDROCHLORIDE 25 MG/1
25 TABLET ORAL EVERY 6 HOURS PRN
Status: DISCONTINUED | OUTPATIENT
Start: 2018-07-10 | End: 2018-07-11 | Stop reason: HOSPADM

## 2018-07-10 RX ORDER — SODIUM CHLORIDE 9 MG/ML
INJECTION, SOLUTION INTRAVENOUS CONTINUOUS
Status: DISCONTINUED | OUTPATIENT
Start: 2018-07-10 | End: 2018-07-10

## 2018-07-10 RX ORDER — ONDANSETRON 8 MG/1
8 TABLET, ORALLY DISINTEGRATING ORAL EVERY 8 HOURS PRN
Status: DISCONTINUED | OUTPATIENT
Start: 2018-07-10 | End: 2018-07-11 | Stop reason: HOSPADM

## 2018-07-10 RX ORDER — LIDOCAINE HCL/PF 100 MG/5ML
SYRINGE (ML) INTRAVENOUS
Status: DISCONTINUED | OUTPATIENT
Start: 2018-07-10 | End: 2018-07-10

## 2018-07-10 RX ORDER — PHENYLEPHRINE HYDROCHLORIDE 10 MG/ML
INJECTION INTRAVENOUS
Status: DISCONTINUED | OUTPATIENT
Start: 2018-07-10 | End: 2018-07-10

## 2018-07-10 RX ADMIN — ACETAMINOPHEN 1000 MG: 10 INJECTION, SOLUTION INTRAVENOUS at 06:07

## 2018-07-10 RX ADMIN — ATORVASTATIN CALCIUM 20 MG: 10 TABLET, FILM COATED ORAL at 08:07

## 2018-07-10 RX ADMIN — MUPIROCIN: 20 OINTMENT TOPICAL at 06:07

## 2018-07-10 RX ADMIN — MIDAZOLAM HYDROCHLORIDE 2 MG: 1 INJECTION, SOLUTION INTRAMUSCULAR; INTRAVENOUS at 07:07

## 2018-07-10 RX ADMIN — PHENYLEPHRINE HYDROCHLORIDE 200 MCG: 10 INJECTION INTRAVENOUS at 07:07

## 2018-07-10 RX ADMIN — GLYCOPYRROLATE 0.6 MG: 0.2 INJECTION, SOLUTION INTRAMUSCULAR; INTRAVENOUS at 08:07

## 2018-07-10 RX ADMIN — ROCURONIUM BROMIDE 40 MG: 10 INJECTION, SOLUTION INTRAVENOUS at 07:07

## 2018-07-10 RX ADMIN — SODIUM CHLORIDE, SODIUM GLUCONATE, SODIUM ACETATE, POTASSIUM CHLORIDE, MAGNESIUM CHLORIDE, SODIUM PHOSPHATE, DIBASIC, AND POTASSIUM PHOSPHATE: .53; .5; .37; .037; .03; .012; .00082 INJECTION, SOLUTION INTRAVENOUS at 07:07

## 2018-07-10 RX ADMIN — PHENYLEPHRINE HYDROCHLORIDE 100 MCG: 10 INJECTION INTRAVENOUS at 07:07

## 2018-07-10 RX ADMIN — ONDANSETRON 4 MG: 2 INJECTION INTRAMUSCULAR; INTRAVENOUS at 08:07

## 2018-07-10 RX ADMIN — PROPOFOL 20 MG: 10 INJECTION, EMULSION INTRAVENOUS at 07:07

## 2018-07-10 RX ADMIN — PROPOFOL 200 MG: 10 INJECTION, EMULSION INTRAVENOUS at 07:07

## 2018-07-10 RX ADMIN — FENTANYL CITRATE 50 MCG: 50 INJECTION, SOLUTION INTRAMUSCULAR; INTRAVENOUS at 07:07

## 2018-07-10 RX ADMIN — CEFAZOLIN 2 G: 330 INJECTION, POWDER, FOR SOLUTION INTRAMUSCULAR; INTRAVENOUS at 07:07

## 2018-07-10 RX ADMIN — LIDOCAINE HYDROCHLORIDE 80 MG: 20 INJECTION, SOLUTION INTRAVENOUS at 07:07

## 2018-07-10 RX ADMIN — SODIUM CHLORIDE: 0.9 INJECTION, SOLUTION INTRAVENOUS at 06:07

## 2018-07-10 RX ADMIN — DEXAMETHASONE SODIUM PHOSPHATE 4 MG: 4 INJECTION, SOLUTION INTRAMUSCULAR; INTRAVENOUS at 07:07

## 2018-07-10 RX ADMIN — NEOSTIGMINE METHYLSULFATE 5 MG: 1 INJECTION INTRAVENOUS at 08:07

## 2018-07-10 RX ADMIN — IBUPROFEN 400 MG: 800 INJECTION INTRAVENOUS at 06:07

## 2018-07-10 NOTE — INTERVAL H&P NOTE
The patient has been examined and the H&P has been reviewed:    I concur with the findings and no changes have occurred since H&P was written.    Anesthesia/Surgery risks, benefits and alternative options discussed and understood by patient/family.          Active Hospital Problems    Diagnosis  POA    Rectal polyp [K62.1]  Yes      Resolved Hospital Problems    Diagnosis Date Resolved POA   No resolved problems to display.

## 2018-07-10 NOTE — BRIEF OP NOTE
[facility]  Brief Operative Note    SUMMARY     Surgery Date: 7/10/2018     Surgeon(s) and Role:     * Medhat Ponce MD - Primary    Assisting Surgeon: Scott Huitron MD    Pre-op Diagnosis:  Rectal polyp [K62.1]    Post-op Diagnosis:  Post-Op Diagnosis Codes:     * Rectal polyp [K62.1]    Procedure(s):  EXCISION, LESION, RECTUM, TRANSANAL APPROACH    Anesthesia: General    Description of Procedure: EUA, transanal excision of rectal polyp    Description of the findings of the procedure: 3x3cm sessile polyp extending to left posterolateral anal canal, distal aspect just proximal to the dentate line, lifted with lidocaine w epi, removed in submucosal plane. Left open. Gelfoam with surgicel placed into anal canal.    Estimated Blood Loss: 5cc         Specimens:   Specimen (12h ago through future)    None

## 2018-07-10 NOTE — TRANSFER OF CARE
"Anesthesia Transfer of Care Note    Patient: Rikki Mello    Procedure(s) Performed: Procedure(s):  EXCISION, LESION, RECTUM, TRANSANAL APPROACH    Patient location: PACU    Anesthesia Type: general    Transport from OR: Transported from OR on 6-10 L/min O2 by face mask with adequate spontaneous ventilation    Post pain: adequate analgesia    Post assessment: no apparent anesthetic complications    Post vital signs: stable    Level of consciousness: awake and alert    Nausea/Vomiting: no nausea/vomiting    Complications: none    Transfer of care protocol was followed      Last vitals:   Visit Vitals  /72 (BP Location: Left arm, Patient Position: Lying)   Pulse 100   Temp 36.5 °C (97.7 °F) (Temporal)   Resp 18   Ht 5' 10.5" (1.791 m)   Wt 91.6 kg (202 lb)   SpO2 100%   BMI 28.57 kg/m²     "

## 2018-07-10 NOTE — ASSESSMENT & PLAN NOTE
Day of Surgery for transanal excision of rectal villous tumor found on screening colonoscopy. History of previous rectal polyp.

## 2018-07-10 NOTE — ANESTHESIA RELEASE NOTE
"Anesthesia Release from PACU Note    Patient: Rikki Mello    Procedure(s) Performed: Procedure(s):  EXCISION, LESION, RECTUM, TRANSANAL APPROACH    Anesthesia type: general    Post pain: Adequate analgesia    Post assessment: no apparent anesthetic complications    Last Vitals:   Visit Vitals  /60 (Patient Position: Lying)   Pulse 60   Temp 36.1 °C (97 °F) (Oral)   Resp 18   Ht 5' 10.5" (1.791 m)   Wt 91.6 kg (202 lb)   SpO2 97%   BMI 28.57 kg/m²       Post vital signs: stable    Level of consciousness: awake, alert  and oriented    Nausea/Vomiting: no nausea/no vomiting    Complications: none    Airway Patency: patent    Respiratory: unassisted    Cardiovascular: stable and blood pressure at baseline    Hydration: euvolemic  "

## 2018-07-10 NOTE — PLAN OF CARE
VSS. Patient denies pain. No N&V. No drainage noted to incisional area. Teds/SCD's in place throughout duration in PACU. Transferred to room 541A, report given to LUIS Momin. Pts wife, Ketty, notified of room assigned.

## 2018-07-10 NOTE — NURSING TRANSFER
Nursing Transfer Note      7/10/2018     Transfer To: 541A    Transfer via stretcher    Transfer with n/a    Transported by LUIS Hemphill    Medicines sent: none    Chart send with patient: Yes    Notified: spouse, Ketty    Patient reassessed at: 1145 7/10/18

## 2018-07-10 NOTE — H&P (VIEW-ONLY)
History & Physical    SUBJECTIVE:     History of Present Illness:  Patient is a 68 y.o. male presents with a rectal villous tumor.  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    Chief Complaint   Patient presents with    Rectal polyp       Review of patient's allergies indicates:   Allergen Reactions    Codeine Itching       Current Outpatient Prescriptions   Medication Sig Dispense Refill    amlodipine-benazepril (LOTREL) 10-40 mg per capsule Take 1 capsule by mouth once daily.      aspirin (ECOTRIN) 81 MG EC tablet Take 81 mg by mouth once daily.       atorvastatin (LIPITOR) 20 MG tablet Take 20 mg by mouth every evening.      co-enzyme Q-10 30 mg capsule Take 30 mg by mouth every evening.      metroNIDAZOLE (FLAGYL) 500 MG tablet On the day before your surgery, take 1 tablet (500 mg) by mouth at 1pm, 2pm and 11pm. 3 tablet 0    neomycin (MYCIFRADIN) 500 mg Tab On the day before your surgery, take 2 tablets (1,000 mg) by mouth at 1pm, 2pm and 11pm. 6 tablet 0     No current facility-administered medications for this visit.        Past Medical History:   Diagnosis Date    BPH (benign prostatic hyperplasia)     General anesthetics causing adverse effect in therapeutic use     last awhile for orientation to fully comeback    Hypertension     Inguinal hernia unilateral, non-recurrent      Past Surgical History:   Procedure Laterality Date    COLONOSCOPY N/A 6/21/2018    Procedure: COLONOSCOPY;  Surgeon: Medhat Ponce MD;  Location: [address];  Service: Endoscopy;  Laterality: N/A;  miralax prep (past preps miralax can not tolerate other preps)    HERNIA REPAIR      TONSILLECTOMY      TUMOR EXCISION      rectum     No family history on file.  Social History   Substance Use Topics    Smoking status: Never Smoker    Smokeless tobacco: Never Used    Alcohol use Yes      Comment:  "once a month        Review of Systems:  Review of Systems   Constitutional: Negative for chills and fever.   Respiratory: Negative for cough and shortness of breath.    Cardiovascular: Negative for chest pain and palpitations.   Gastrointestinal: Negative for nausea and vomiting.   Genitourinary: Negative for dysuria and urgency.   Neurological: Negative for seizures and numbness.       OBJECTIVE:     Vital Signs (Most Recent)  Pulse: (!) 53 (07/02/18 0929)  BP: (!) 147/76 (07/02/18 0929)  5' 10" (1.778 m)  93.1 kg (205 lb 4 oz)     Physical Exam:  Physical Exam   Constitutional: He is oriented to person, place, and time. He appears well-developed and well-nourished.   Eyes: Conjunctivae and EOM are normal.   Pulmonary/Chest: Effort normal. No respiratory distress.   Genitourinary:   Genitourinary Comments: 30 mm flat villous lesion posterior in the rectum starting at the dentate line. Normal tone.   Musculoskeletal: Normal range of motion. He exhibits no edema.   Neurological: He is alert and oriented to person, place, and time.   Skin: Skin is warm and dry.       Laboratory  none    Diagnostic Results:  None    ASSESSMENT/PLAN:     Rectal villous tumor    PLAN:Plan     Transanal excision, possible TAMIS  I have explained the risks, benefits, and alternatives of the procedure in detail.  The patient voices understanding and all questions have been answered. The patient agrees to proceed as planned.         "

## 2018-07-10 NOTE — ANESTHESIA POSTPROCEDURE EVALUATION
"Anesthesia Post Evaluation    Patient: Rikki Mello    Procedure(s) Performed: Procedure(s):  EXCISION, LESION, RECTUM, TRANSANAL APPROACH    Final Anesthesia Type: general  Patient location during evaluation: PACU  Patient participation: Yes- Able to Participate  Level of consciousness: awake and alert  Post-procedure vital signs: reviewed and stable  Pain management: adequate  Airway patency: patent  PONV status at discharge: No PONV  Anesthetic complications: no      Cardiovascular status: blood pressure returned to baseline  Respiratory status: unassisted  Hydration status: euvolemic  Follow-up not needed.        Visit Vitals  /60 (Patient Position: Lying)   Pulse 60   Temp 36.1 °C (97 °F) (Oral)   Resp 18   Ht 5' 10.5" (1.791 m)   Wt 91.6 kg (202 lb)   SpO2 97%   BMI 28.57 kg/m²       Pain/Tawny Score: Pain Assessment Performed: Yes (7/10/2018  3:50 PM)  Presence of Pain: denies (7/10/2018  3:50 PM)  Pain Rating Prior to Med Admin: 0 (7/10/2018  3:50 PM)  Tawny Score: 10 (7/10/2018 11:45 AM)      "

## 2018-07-11 VITALS
TEMPERATURE: 98 F | BODY MASS INDEX: 28.28 KG/M2 | HEIGHT: 71 IN | OXYGEN SATURATION: 95 % | RESPIRATION RATE: 18 BRPM | WEIGHT: 202 LBS | HEART RATE: 60 BPM | SYSTOLIC BLOOD PRESSURE: 124 MMHG | DIASTOLIC BLOOD PRESSURE: 68 MMHG

## 2018-07-11 PROCEDURE — G0378 HOSPITAL OBSERVATION PER HR: HCPCS

## 2018-07-11 PROCEDURE — 25000003 PHARM REV CODE 250: Performed by: COLON & RECTAL SURGERY

## 2018-07-11 RX ORDER — OXYCODONE HYDROCHLORIDE 5 MG/1
5 TABLET ORAL EVERY 4 HOURS PRN
Qty: 10 TABLET | Refills: 0 | Status: SHIPPED | OUTPATIENT
Start: 2018-07-11 | End: 2018-07-30

## 2018-07-11 RX ORDER — CALCIUM CARBONATE 200(500)MG
1000 TABLET,CHEWABLE ORAL EVERY 8 HOURS PRN
COMMUNITY
Start: 2018-07-11 | End: 2020-01-02

## 2018-07-11 RX ADMIN — AMLODIPINE AND BENAZEPRIL HYDROCHLORIDE 2 CAPSULE: 5; 20 CAPSULE ORAL at 08:07

## 2018-07-11 NOTE — PROGRESS NOTES
Pt d/c home per MD order. Pt verbalized understanding d/c instructions. IV removed and catheter tip intact. Written prescriptions given to patient. VSS. Medication reconciliation done. Pt left via wheelchair escorted by staff and family.

## 2018-07-11 NOTE — PLAN OF CARE
Problem: Patient Care Overview  Goal: Plan of Care Review  Outcome: Ongoing (interventions implemented as appropriate)  Patient denies pain.  Vitals stable.  Afebrile.  NV intact.  Skin intact.  Free from falls.  Frequent rounds made to assess pain, safety, and comfort.  Bed at lowest position, side rails up x 2, and call light within patients reach.

## 2018-07-12 NOTE — OP NOTE
DATE OF PROCEDURE:  07/10/2018    PREOPERATIVE DIAGNOSIS:  Rectal polyp.    POSTOPERATIVE DIAGNOSIS:  Rectal polyp.    PROCEDURES:  Examination under anesthesia, transanal excision of rectal mass.    SURGEON:  Medhat Ponce M.D.    ASSISTANT:  Scott Huitron M.D., fellow.    ANESTHESIA:  General.    ESTIMATED BLOOD LOSS:  5 mL.    SPECIMENS:  Rectal lesion.    FINDINGS:  1.  3 x 3 cm sessile polyp extending to the left posterolateral anal canal with   the distal aspect just proximal to the dentate line.  2.  Defect left open to heal in.  3.  Gelfoam with Surgicel placed into anal canal.  4.  Ritter catheter placed prior to surgery.    INDICATIONS FOR PROCEDURE:  The patient is a 68-year-old male with history of   rectal villous tumor, which was originally removed about 10 years ago and   recently on a repeat surveillance colonoscopy was found to have recurred.    Risks, benefits and alternatives were discussed with the patient and consent was   obtained for a transanal excision of this mass, possible TAMIS.    DETAILS OF PROCEDURE:  The patient was brought to the OR after being correctly   identified and was placed in the high lithotomy position after being induced   under general anesthesia.  SCDs were on and pumping prior to induction.  The   patient's perineum was prepped and draped in the standard sterile fashion.    Prior to incision, a time-out was performed confirming correct patient,   procedure, surgeon and perioperative medications.  Of note, a Ritter catheter was   inserted under sterile technique prior to prepping and draping.  A digital   rectal exam was performed and a soft approximately 3 cm lesion could be felt in   the posterior anal canal extending to the left aspect.  This was not fixed to   the underlying muscle and again was soft.  We deemed that this lesion was too   low to perform a TAMIS and therefore proceeded with a transanal excision.  We   used the plastic Rome retractors to expose  the polyp and we noted that the   distal aspect started just above the dentate line and progressed about 3 cm   cephalad and it was about 3 cm wide as well.  We lifted the polyp using   lidocaine with epinephrine.  Once this was done, we marked out an approximately   1 cm circumferential margin using the electrocautery.  This was then brought   through the level of the mucosa to the submucosal plane.  We dissected in this   plane and did at one point take some underlying muscle, but we were able to   completely excise the polyp with grossly clear margins.  We marked the mass with   sutures with a short stitch indicating the distal aspect and a long stitch   indicating the patient's left side.  This was sent for Pathology marked.  Due to   the size of the defect and the fact that we were extraperitoneal, we opted to   leave the defect open rather than attempting to close it.  We assured hemostasis   and placed a Gelfoam with Surgicel wrapped around it into the anal canal.  The   patient's Ritter catheter was then removed and he was brought out from under   anesthesia.  He was transferred to the PACU and will stay overnight.  All lap,   needle and sponge counts were reported as correct.  We did perform a perianal   block using Exparel solution.  Dr. Ponce was present for all aspects of the   surgery.      MF/HN  dd: 07/11/2018 16:36:56 (CDT)  td: 07/11/2018 21:16:11 (CDT)  Doc ID   #[id number]  Job ID #[id number]    CC:

## 2018-07-12 NOTE — DISCHARGE SUMMARY
[facility]  Colorectal Surgery  Discharge Summary      Patient Name: Rikki Mello  MRN: [id number]  Admission Date: 7/10/2018  Hospital Length of Stay: 1 days  Discharge Date and Time: 7/11/2018 10:52 AM  Attending Physician: Medhat Ponce MD  Discharging Provider: Candy Coffey MD  Primary Care Provider: Primary Doctor No     HPI: Patient is a 68 y.o. male presents with a rectal villous tumor.  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 30 mm flat villous lesion posterior in the rectum starting at the dentate line. Normal tone.     Procedure(s):  EXCISION, LESION, RECTUM, TRANSANAL APPROACH     Hospital Course: Patient was admitted to [facility] on 7/2/18 for Transanal excision of rectal villous tumor with Dr. Medhat Ponce. Findings were as follows: 3x3cm sessile polyp extending to left posterolateral anal canal, distal aspect just proximal to the dentate line, lifted with lidocaine w epi, removed in submucosal plane. Left open. Gelfoam with surgicel placed into anal canal. Pt was extubated and transported to the PACU in stable condition.    Patient was admitted for observation to the general surgery floor. Throughout his hospitalization, the patient remained afebrile with normal vital signs. He tolerated a low fiber low residue diet. He was able to ambulate, pain was well controlled, and he was deemed medically fit for discharge on 7/11/18.         Significant Diagnostic Studies: Labs: CMP No results for input(s): NA, K, CL, CO2, GLU, BUN, CREATININE, CALCIUM, PROT, ALBUMIN, BILITOT, ALKPHOS, AST, ALT, ANIONGAP, ESTGFRAFRICA, EGFRNONAA in the last 48 hours. and CBC No results for input(s): WBC, HGB, HCT, PLT in the last 48 hours.    Pending Diagnostic Studies:     None        Final Active Diagnoses:    Diagnosis Date Noted POA    PRINCIPAL PROBLEM:  Rectal polyp [K62.1] 07/10/2018  Yes      Problems Resolved During this Admission:    Diagnosis Date Noted Date Resolved POA      Discharged Condition: good    Disposition: Home or Self Care    Follow Up:  Follow-up Information     Medhat Ponce MD In 2 weeks.    Specialty:  Colon and Rectal Surgery  Contact information:  aRdha HAINES  [facility] [id number] [phone]                 Patient Instructions:     Diet Adult Regular     Notify your health care provider if you experience any of the following:  temperature >100.4     Notify your health care provider if you experience any of the following:  persistent nausea and vomiting or diarrhea     Notify your health care provider if you experience any of the following:  severe uncontrolled pain     Notify your health care provider if you experience any of the following:  redness, tenderness, or signs of infection (pain, swelling, redness, odor or green/yellow discharge around incision site)     No dressing needed     Activity as tolerated   Order Comments: OK to shower.       Medications:  Reconciled Home Medications:      Medication List      START taking these medications    calcium carbonate 200 mg calcium (500 mg) chewable tablet  Commonly known as:  TUMS  Take 2 tablets (1,000 mg total) by mouth every 8 (eight) hours as needed.     oxyCODONE 5 MG immediate release tablet  Commonly known as:  ROXICODONE  Take 1 tablet (5 mg total) by mouth every 4 (four) hours as needed (moderate to severe pain).        CONTINUE taking these medications    amlodipine-benazepril 10-40 mg per capsule  Commonly known as:  LOTREL  Take 1 capsule by mouth once daily.     aspirin 81 MG EC tablet  Commonly known as:  ECOTRIN  Take 81 mg by mouth once daily.     atorvastatin 20 MG tablet  Commonly known as:  LIPITOR  Take 20 mg by mouth every evening.     co-enzyme Q-10 30 mg capsule  Take 30 mg by mouth every evening.     metroNIDAZOLE 500 MG tablet  Commonly known as:  FLAGYL  On the day before your  surgery, take 1 tablet (500 mg) by mouth at 1pm, 2pm and 11pm.     neomycin 500 mg Tab  Commonly known as:  MYCIFRADIN  On the day before your surgery, take 2 tablets (1,000 mg) by mouth at 1pm, 2pm and 11pm.            Candy Coffey MD  Colorectal Surgery  [facility]

## 2018-07-12 NOTE — OP NOTE
DATE OF PROCEDURE:  07/10/2018.    PREOPERATIVE DIAGNOSIS:  Rectal mass.    POSTOPERATIVE DIAGNOSIS:  Rectal mass.    OPERATION:  Transanal excision of rectal mass.    SURGEON:  Medhat Ponce M.D.    ASSISTANT:  Scott Huitron M.D.    ANESTHESIA:  General endotracheal.    SPECIMENS:  Rectal mass.    ESTIMATED BLOOD LOSS:  Less than 20 mL.    COMPLICATIONS:  None.    DESCRIPTION OF PROCEDURE:  After obtaining informed consent, the patient was   taken to the Operating Room and placed in the supine position.  He underwent   general endotracheal anesthesia and then was placed in lithotomy.  He was   prepped and draped in sterile manner.  A [place] retractor was placed.    Plastic Rome anoscopes were used to expose the lesion, which was posterior   just at the dentate line.  It was approximately 2.5 cm in diameter.  The area   was elevated with lidocaine with epinephrine and then incised along its lateral   edges with 5 mm margins of mucosa.  Using a combination of sharp dissection and   electrocautery, the lesion was excised in the submucosal plane.  There were some   areas where the scarring was adherent to the underlying muscle.  Hemostasis was   obtained.  Once it was completely excised, mucosa was not closed.  A piece of   Gelfoam was placed in the anal canal, wrapped with Surgicel.  The patient   tolerated the procedure well, was taken to Recovery Room in stable condition.      FATOU/FILI  dd: 07/12/2018 09:44:35 (CDT)  td: 07/12/2018 11:40:04 (ERLINDA)  Doc ID   #[id number]  Job ID #[id number]    CC:

## 2018-07-17 ENCOUNTER — ANESTHESIA (OUTPATIENT)
Dept: SURGERY | Facility: HOSPITAL | Age: 68
End: 2018-07-17
Payer: COMMERCIAL

## 2018-07-19 ENCOUNTER — TELEPHONE (OUTPATIENT)
Dept: SURGERY | Facility: CLINIC | Age: 68
End: 2018-07-19

## 2018-07-19 NOTE — TELEPHONE ENCOUNTER
Spoke with patient and informed him that the pressure in his rectum is possibly due to swelling.  Instructed him to do sitz baths and take 1 tsp of fiber for 1 week then 2 tsps the second week.  He will call in a week if this is not working.

## 2018-07-19 NOTE — TELEPHONE ENCOUNTER
----- Message from Medhat Ponce MD sent at 7/17/2018  3:35 PM CDT -----  MJ, please let Mr Riaz know his path is benign.  If he is not having any problems with pain or bleeding or bowel movements - we can have his first follow up in 3 months.

## 2018-07-23 ENCOUNTER — TELEPHONE (OUTPATIENT)
Dept: SURGERY | Facility: CLINIC | Age: 68
End: 2018-07-23

## 2018-07-23 NOTE — TELEPHONE ENCOUNTER
----- Message from Adalgisa Ramos sent at 7/23/2018  8:27 AM CDT -----  Contact: Ketty pt wife#[phone]  Needs Advice    Reason for call:    Pt states that she wants to speak with you about appt.  Communication Preference:callback   Additional Information:

## 2018-07-23 NOTE — TELEPHONE ENCOUNTER
Spoke with Ketty, patient's wife.  She reports that she is having toothpaste like stool coming out of rectum.  She would like to have him come in Monday 7/30 to follow up with Dr. Ponce.

## 2018-07-30 ENCOUNTER — OFFICE VISIT (OUTPATIENT)
Dept: SURGERY | Facility: CLINIC | Age: 68
End: 2018-07-30
Payer: COMMERCIAL

## 2018-07-30 VITALS
HEART RATE: 56 BPM | HEIGHT: 70 IN | SYSTOLIC BLOOD PRESSURE: 146 MMHG | WEIGHT: 206.81 LBS | DIASTOLIC BLOOD PRESSURE: 73 MMHG | BODY MASS INDEX: 29.61 KG/M2

## 2018-07-30 DIAGNOSIS — Z09 POSTOP CHECK: Primary | ICD-10-CM

## 2018-07-30 DIAGNOSIS — K62.1 RECTAL POLYP: ICD-10-CM

## 2018-07-30 PROCEDURE — 99024 POSTOP FOLLOW-UP VISIT: CPT | Mod: S$GLB,,, | Performed by: COLON & RECTAL SURGERY

## 2018-07-30 PROCEDURE — 99999 PR PBB SHADOW E&M-EST. PATIENT-LVL II: CPT | Mod: PBBFAC,,, | Performed by: COLON & RECTAL SURGERY

## 2018-07-31 NOTE — PROGRESS NOTES
S/p transanal excision of rectal villous tumor on 7/10.  Has had 2 episodes of bleeding.  Some drainage.  Pain has resolved in the past 3 days.    No exam today.    Doing well postop.  Increase fiber.  Activity ad delon.  F/u 2-3 months -discussed importance of surveillance

## 2018-08-13 ENCOUNTER — TELEPHONE (OUTPATIENT)
Dept: SURGERY | Facility: CLINIC | Age: 68
End: 2018-08-13

## 2018-08-13 NOTE — TELEPHONE ENCOUNTER
----- Message from Jenna Gore MA sent at 8/13/2018 11:31 AM CDT -----  Contact: [phone]   Needs Advice    Reason for call:  Pt states that he is still having rectal bleeding from a procedure he has in July    Communication Preference: [phone] (H)  [phone] (M)

## 2018-08-13 NOTE — TELEPHONE ENCOUNTER
Spoke with patient.  Patient reports that he has some blood with wiping.  Instructed patient to use fibercon in the morning, soak in a tub of water and pat dry after going to the bathroom.

## 2018-11-05 ENCOUNTER — OFFICE VISIT (OUTPATIENT)
Dept: SURGERY | Facility: CLINIC | Age: 68
End: 2018-11-05
Payer: COMMERCIAL

## 2018-11-05 VITALS
DIASTOLIC BLOOD PRESSURE: 78 MMHG | HEART RATE: 80 BPM | WEIGHT: 209.19 LBS | SYSTOLIC BLOOD PRESSURE: 138 MMHG | BODY MASS INDEX: 29.95 KG/M2 | HEIGHT: 70 IN

## 2018-11-05 DIAGNOSIS — K62.1 RECTAL POLYP: Primary | ICD-10-CM

## 2018-11-05 PROCEDURE — 88305 TISSUE EXAM BY PATHOLOGIST: CPT | Performed by: PATHOLOGY

## 2018-11-05 PROCEDURE — 99213 OFFICE O/P EST LOW 20 MIN: CPT | Mod: 25,S$GLB,, | Performed by: COLON & RECTAL SURGERY

## 2018-11-05 PROCEDURE — 99999 PR PBB SHADOW E&M-EST. PATIENT-LVL III: CPT | Mod: PBBFAC,,, | Performed by: COLON & RECTAL SURGERY

## 2018-11-05 PROCEDURE — 1101F PT FALLS ASSESS-DOCD LE1/YR: CPT | Mod: CPTII,S$GLB,, | Performed by: COLON & RECTAL SURGERY

## 2018-11-05 PROCEDURE — 88305 TISSUE EXAM BY PATHOLOGIST: CPT | Mod: 26,,, | Performed by: PATHOLOGY

## 2018-11-05 PROCEDURE — 45338 SIGMOIDOSCOPY W/TUMR REMOVE: CPT | Mod: S$GLB,,, | Performed by: COLON & RECTAL SURGERY

## 2018-11-05 NOTE — PROGRESS NOTES
Subjective:       Patient ID: Rikki Mello is a 68 y.o. male.    Chief Complaint: Follow-up    HPI established patient. S status post transanal excision of a rectal villous tumor in July 2018.  This was a recurrent lesion.  He states that he had a 5 week.  Of ongoing bleeding that is now resolved.  Normal bowel function.  No pain with bowel movements.    Review of Systems   Constitutional: Negative for chills and fever.   Respiratory: Negative for cough and shortness of breath.    Cardiovascular: Negative for chest pain and palpitations.   Gastrointestinal: Negative for nausea and vomiting.   Genitourinary: Negative for dysuria and urgency.   Neurological: Negative for seizures and numbness.       Objective:      Physical Exam   Constitutional: He is oriented to person, place, and time. He appears well-developed and well-nourished.   Eyes: Conjunctivae and EOM are normal.   Pulmonary/Chest: Effort normal. No respiratory distress.   Genitourinary:   Genitourinary Comments: PROCEDURE: Flexible Sigmoidoscopy   Scope # Olymp [id number]    With informed consent the flexible sigmoidoscope was passed 10 cm under direcet vision. Prep quality: excellent    Findings: well healed scar with 6mm flat polypoid lesion - snared with cold snare.     Specimen: None    EBL: None    The procedure was tolerated well. The patient was discharged to home ambulatory. Complications: None   Musculoskeletal: Normal range of motion. He exhibits no edema.   Neurological: He is alert and oriented to person, place, and time.   Skin: Skin is warm and dry.       Assessment:       1. Rectal polyp        Plan:       Check path.  If adenoma will do sigmoidoscopy with sedation in 4 months.  If granulation tissue will do sigmoidoscopy in the office in 6 months.

## 2019-06-05 NOTE — PROGRESS NOTES
Subjective:       Patient ID: Rikki Mello is a 69 y.o. male.    Chief Complaint: History of villous adenoma of the sigmoid.     HPI     established patient.     Mr. 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  This was a recurrent lesion.      Last seen in clinic 11/18 - Flex Sigmoidoscopy with a 6 mm flat polypoid lesion which was snared     Pathology from Flex Sig 11/18: Granulation tissue.     Pathology from Transanal excision: Villious adenoma with positive distal margin.     Comes today for Flex sig.  Reports no issues, denies any rectal bleeding,  Blood in stool, or rectal pain. Reports occasional constipation but otherwise usually daily soft bowel        Review of Systems   Constitutional: Negative for chills and fever.   Respiratory: Negative for cough and shortness of breath.    Cardiovascular: Negative for chest pain and palpitations.   Gastrointestinal: Negative for nausea and vomiting.   Genitourinary: Negative for dysuria and urgency.   Neurological: Negative for seizures and numbness.       Objective:     PE:   APPEARANCE: Well nourished, well developed, in no acute distress.   HEENT: Conjunctiva pink, no icterus  NECK: supple, FROM  CHEST: Lungs clear. Normal respiratory effort.  CARDIOVASCULAR: Normal heart sounds.  No edema.  ABDOMEN: Soft. No tenderness or distention.  No masses or hernia.  MUSCULOSKELETAL: Symmetric, normal range of motion and strength.   NEURO: Alert and oriented to person, place, and time.   SKIN: Warm and dry.   PSYCHIATRIC: Normal mood and affect. Behavior is normal and appropriate.       PROCEDURE: FLEXIBLE SIGMOIDOSCOPY:    After explaining the procedure, indications, risks (including but not limited to bleeding and perforation), and benefits, verbal informed consent was obtained. Using the Olympus OSF-3 instrument, flexible sigmoidoscopy was carried out.  Proceeded to 12 cm.   Findings: mild stenosis at previous excision site  but easily about to pass scope, no evidence of recurrence of TVA.      No complications were encountered;  the procedure was well tolerated.  The patient is asked to call if any unusual pains or bleeding occur.  May resume normal diet and activities at this time.      Assessment/Plan:       Mr. 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      -flex sig today in office w/ no evidence of recurrence  -plan for repeat flex sig in office in 6 months

## 2019-06-06 ENCOUNTER — OFFICE VISIT (OUTPATIENT)
Dept: SURGERY | Facility: CLINIC | Age: 69
End: 2019-06-06
Payer: COMMERCIAL

## 2019-06-06 VITALS
SYSTOLIC BLOOD PRESSURE: 155 MMHG | HEIGHT: 70 IN | HEART RATE: 60 BPM | WEIGHT: 207.69 LBS | DIASTOLIC BLOOD PRESSURE: 78 MMHG | BODY MASS INDEX: 29.73 KG/M2

## 2019-06-06 DIAGNOSIS — Z87.19 HISTORY OF RECTAL POLYPS: Primary | ICD-10-CM

## 2019-06-06 PROCEDURE — 99999 PR PBB SHADOW E&M-EST. PATIENT-LVL III: ICD-10-PCS | Mod: PBBFAC,,, | Performed by: COLON & RECTAL SURGERY

## 2019-06-06 PROCEDURE — 99212 PR OFFICE/OUTPT VISIT, EST, LEVL II, 10-19 MIN: ICD-10-PCS | Mod: 25,S$GLB,, | Performed by: COLON & RECTAL SURGERY

## 2019-06-06 PROCEDURE — 99999 PR PBB SHADOW E&M-EST. PATIENT-LVL III: CPT | Mod: PBBFAC,,, | Performed by: COLON & RECTAL SURGERY

## 2019-06-06 PROCEDURE — 99212 OFFICE O/P EST SF 10 MIN: CPT | Mod: 25,S$GLB,, | Performed by: COLON & RECTAL SURGERY

## 2019-06-06 PROCEDURE — 1101F PT FALLS ASSESS-DOCD LE1/YR: CPT | Mod: CPTII,S$GLB,, | Performed by: COLON & RECTAL SURGERY

## 2019-06-06 PROCEDURE — 45330 PR SIGMOIDOSCOPY,DIAG2STIC: ICD-10-PCS | Mod: S$GLB,,, | Performed by: COLON & RECTAL SURGERY

## 2019-06-06 PROCEDURE — 1101F PR PT FALLS ASSESS DOC 0-1 FALLS W/OUT INJ PAST YR: ICD-10-PCS | Mod: CPTII,S$GLB,, | Performed by: COLON & RECTAL SURGERY

## 2019-06-06 PROCEDURE — 45330 DIAGNOSTIC SIGMOIDOSCOPY: CPT | Mod: S$GLB,,, | Performed by: COLON & RECTAL SURGERY

## 2019-06-06 RX ORDER — AMLODIPINE AND BENAZEPRIL HYDROCHLORIDE 10; 40 MG/1; MG/1
1 CAPSULE ORAL
COMMUNITY
Start: 2019-05-20

## 2019-06-06 RX ORDER — UBIDECARENONE 60 MG
200 CAPSULE ORAL
COMMUNITY
End: 2019-06-06 | Stop reason: SDUPTHER

## 2019-06-06 RX ORDER — SILDENAFIL CITRATE 20 MG/1
100 TABLET ORAL
COMMUNITY
Start: 2018-06-11

## 2019-06-06 RX ORDER — CIPROFLOXACIN 500 MG/1
TABLET ORAL
Refills: 0 | COMMUNITY
Start: 2019-05-16 | End: 2020-01-02

## 2019-06-06 RX ORDER — CEPHALEXIN 500 MG/1
CAPSULE ORAL
Refills: 0 | COMMUNITY
Start: 2019-03-25 | End: 2020-01-02

## 2019-06-06 RX ORDER — PREDNISOLONE ACETATE 10 MG/ML
SUSPENSION/ DROPS OPHTHALMIC
Refills: 0 | COMMUNITY
Start: 2019-03-05 | End: 2020-01-02

## 2019-06-06 RX ORDER — MOXIFLOXACIN 5 MG/ML
SOLUTION/ DROPS OPHTHALMIC
Refills: 0 | COMMUNITY
Start: 2019-03-05 | End: 2020-01-02

## 2019-06-06 RX ORDER — MUPIROCIN 20 MG/G
OINTMENT TOPICAL
Refills: 0 | COMMUNITY
Start: 2019-03-25 | End: 2021-04-13

## 2019-06-06 RX ORDER — ATORVASTATIN CALCIUM 20 MG/1
20 TABLET, FILM COATED ORAL
COMMUNITY
Start: 2018-12-18 | End: 2023-10-27

## 2019-06-06 RX ORDER — KETOROLAC TROMETHAMINE 5 MG/ML
SOLUTION OPHTHALMIC
Refills: 0 | COMMUNITY
Start: 2019-03-05 | End: 2020-01-02

## 2020-01-02 ENCOUNTER — OFFICE VISIT (OUTPATIENT)
Dept: SURGERY | Facility: CLINIC | Age: 70
End: 2020-01-02
Payer: COMMERCIAL

## 2020-01-02 VITALS
BODY MASS INDEX: 29.86 KG/M2 | SYSTOLIC BLOOD PRESSURE: 157 MMHG | HEIGHT: 70 IN | WEIGHT: 208.56 LBS | HEART RATE: 71 BPM | DIASTOLIC BLOOD PRESSURE: 76 MMHG

## 2020-01-02 DIAGNOSIS — Z87.19 HISTORY OF RECTAL POLYPS: Primary | ICD-10-CM

## 2020-01-02 PROCEDURE — 1101F PT FALLS ASSESS-DOCD LE1/YR: CPT | Mod: CPTII,S$GLB,, | Performed by: COLON & RECTAL SURGERY

## 2020-01-02 PROCEDURE — 99212 OFFICE O/P EST SF 10 MIN: CPT | Mod: 25,S$GLB,, | Performed by: COLON & RECTAL SURGERY

## 2020-01-02 PROCEDURE — 45330 DIAGNOSTIC SIGMOIDOSCOPY: CPT | Mod: S$GLB,,, | Performed by: COLON & RECTAL SURGERY

## 2020-01-02 PROCEDURE — 99999 PR PBB SHADOW E&M-EST. PATIENT-LVL III: ICD-10-PCS | Mod: PBBFAC,,, | Performed by: COLON & RECTAL SURGERY

## 2020-01-02 PROCEDURE — 1159F PR MEDICATION LIST DOCUMENTED IN MEDICAL RECORD: ICD-10-PCS | Mod: S$GLB,,, | Performed by: COLON & RECTAL SURGERY

## 2020-01-02 PROCEDURE — 99212 PR OFFICE/OUTPT VISIT, EST, LEVL II, 10-19 MIN: ICD-10-PCS | Mod: 25,S$GLB,, | Performed by: COLON & RECTAL SURGERY

## 2020-01-02 PROCEDURE — 99999 PR PBB SHADOW E&M-EST. PATIENT-LVL III: CPT | Mod: PBBFAC,,, | Performed by: COLON & RECTAL SURGERY

## 2020-01-02 PROCEDURE — 1101F PR PT FALLS ASSESS DOC 0-1 FALLS W/OUT INJ PAST YR: ICD-10-PCS | Mod: CPTII,S$GLB,, | Performed by: COLON & RECTAL SURGERY

## 2020-01-02 PROCEDURE — 1159F MED LIST DOCD IN RCRD: CPT | Mod: S$GLB,,, | Performed by: COLON & RECTAL SURGERY

## 2020-01-02 PROCEDURE — 1126F PR PAIN SEVERITY QUANTIFIED, NO PAIN PRESENT: ICD-10-PCS | Mod: S$GLB,,, | Performed by: COLON & RECTAL SURGERY

## 2020-01-02 PROCEDURE — 1126F AMNT PAIN NOTED NONE PRSNT: CPT | Mod: S$GLB,,, | Performed by: COLON & RECTAL SURGERY

## 2020-01-02 PROCEDURE — 45330 PR SIGMOIDOSCOPY,DIAG2STIC: ICD-10-PCS | Mod: S$GLB,,, | Performed by: COLON & RECTAL SURGERY

## 2020-01-02 RX ORDER — TAMSULOSIN HYDROCHLORIDE 0.4 MG/1
CAPSULE ORAL
COMMUNITY
Start: 2019-12-13

## 2020-01-02 NOTE — PROGRESS NOTES
Subjective:       Patient ID: Rikki Mello is a 69 y.o. male.    Chief Complaint: History of rectal polyp    HPI  established patient. Mr. 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  This was a recurrent lesion.  His last clinic visit was June of 2019 at which time he had no evidence of recurrence.    no colorectal symptoms at this time.      Review of Systems   Constitutional: Negative for chills and fever.   Respiratory: Negative for cough and shortness of breath.    Cardiovascular: Negative for chest pain and palpitations.   Gastrointestinal: Negative for nausea and vomiting.   Genitourinary: Negative for dysuria and urgency.   Neurological: Negative for seizures and numbness.       Objective:      Physical Exam   Constitutional: He is oriented to person, place, and time. He appears well-developed and well-nourished.   Eyes: Conjunctivae and EOM are normal.   Pulmonary/Chest: Effort normal. No respiratory distress.   Genitourinary:   Genitourinary Comments: PROCEDURE: Flexible Sigmoidoscopy   Scope # [id number]    With informed consent the flexible sigmoidoscope was passed 20 cm under direcet vision. Prep quality: excellent    Findings: Scar in distal rectum.  No evidence of recurrent villous adenoma.    Specimen: None    EBL: None    The procedure was tolerated well. The patient was discharged to home ambulatory. Complications: None    Musculoskeletal: Normal range of motion. He exhibits no edema.   Neurological: He is alert and oriented to person, place, and time.   Skin: Skin is warm and dry.       Assessment:       1. History of rectal polyps        Plan:       No evidence of recurrence.  Follow-up 6 months for surveillance exam.

## 2021-04-08 ENCOUNTER — OFFICE VISIT (OUTPATIENT)
Dept: SURGERY | Facility: CLINIC | Age: 71
End: 2021-04-08
Payer: COMMERCIAL

## 2021-04-08 VITALS
HEIGHT: 70 IN | DIASTOLIC BLOOD PRESSURE: 75 MMHG | SYSTOLIC BLOOD PRESSURE: 162 MMHG | WEIGHT: 206.19 LBS | HEART RATE: 62 BPM | BODY MASS INDEX: 29.52 KG/M2

## 2021-04-08 DIAGNOSIS — Z87.19 HISTORY OF RECTAL POLYPS: Primary | ICD-10-CM

## 2021-04-08 PROCEDURE — 1157F PR ADVANCE CARE PLAN OR EQUIV PRESENT IN MEDICAL RECORD: ICD-10-PCS | Mod: S$GLB,,, | Performed by: COLON & RECTAL SURGERY

## 2021-04-08 PROCEDURE — 99999 PR PBB SHADOW E&M-EST. PATIENT-LVL III: ICD-10-PCS | Mod: PBBFAC,,, | Performed by: COLON & RECTAL SURGERY

## 2021-04-08 PROCEDURE — 3288F FALL RISK ASSESSMENT DOCD: CPT | Mod: CPTII,S$GLB,, | Performed by: COLON & RECTAL SURGERY

## 2021-04-08 PROCEDURE — 1159F PR MEDICATION LIST DOCUMENTED IN MEDICAL RECORD: ICD-10-PCS | Mod: S$GLB,,, | Performed by: COLON & RECTAL SURGERY

## 2021-04-08 PROCEDURE — 1101F PR PT FALLS ASSESS DOC 0-1 FALLS W/OUT INJ PAST YR: ICD-10-PCS | Mod: CPTII,S$GLB,, | Performed by: COLON & RECTAL SURGERY

## 2021-04-08 PROCEDURE — 99213 PR OFFICE/OUTPT VISIT, EST, LEVL III, 20-29 MIN: ICD-10-PCS | Mod: 25,S$GLB,, | Performed by: COLON & RECTAL SURGERY

## 2021-04-08 PROCEDURE — 99999 PR PBB SHADOW E&M-EST. PATIENT-LVL III: CPT | Mod: PBBFAC,,, | Performed by: COLON & RECTAL SURGERY

## 2021-04-08 PROCEDURE — 1159F MED LIST DOCD IN RCRD: CPT | Mod: S$GLB,,, | Performed by: COLON & RECTAL SURGERY

## 2021-04-08 PROCEDURE — 3008F PR BODY MASS INDEX (BMI) DOCUMENTED: ICD-10-PCS | Mod: CPTII,S$GLB,, | Performed by: COLON & RECTAL SURGERY

## 2021-04-08 PROCEDURE — 3008F BODY MASS INDEX DOCD: CPT | Mod: CPTII,S$GLB,, | Performed by: COLON & RECTAL SURGERY

## 2021-04-08 PROCEDURE — 3288F PR FALLS RISK ASSESSMENT DOCUMENTED: ICD-10-PCS | Mod: CPTII,S$GLB,, | Performed by: COLON & RECTAL SURGERY

## 2021-04-08 PROCEDURE — 1101F PT FALLS ASSESS-DOCD LE1/YR: CPT | Mod: CPTII,S$GLB,, | Performed by: COLON & RECTAL SURGERY

## 2021-04-08 PROCEDURE — 1126F PR PAIN SEVERITY QUANTIFIED, NO PAIN PRESENT: ICD-10-PCS | Mod: S$GLB,,, | Performed by: COLON & RECTAL SURGERY

## 2021-04-08 PROCEDURE — 45330 PR SIGMOIDOSCOPY,DIAG2STIC: ICD-10-PCS | Mod: S$GLB,,, | Performed by: COLON & RECTAL SURGERY

## 2021-04-08 PROCEDURE — 99213 OFFICE O/P EST LOW 20 MIN: CPT | Mod: 25,S$GLB,, | Performed by: COLON & RECTAL SURGERY

## 2021-04-08 PROCEDURE — 45330 DIAGNOSTIC SIGMOIDOSCOPY: CPT | Mod: S$GLB,,, | Performed by: COLON & RECTAL SURGERY

## 2021-04-08 PROCEDURE — 1157F ADVNC CARE PLAN IN RCRD: CPT | Mod: S$GLB,,, | Performed by: COLON & RECTAL SURGERY

## 2021-04-08 PROCEDURE — 1126F AMNT PAIN NOTED NONE PRSNT: CPT | Mod: S$GLB,,, | Performed by: COLON & RECTAL SURGERY

## 2022-05-25 ENCOUNTER — TELEPHONE (OUTPATIENT)
Dept: SURGERY | Facility: CLINIC | Age: 72
End: 2022-05-25
Payer: COMMERCIAL

## 2022-05-25 DIAGNOSIS — Z87.19 HISTORY OF RECTAL POLYPS: Primary | ICD-10-CM

## 2022-05-25 NOTE — TELEPHONE ENCOUNTER
Spoke to patient in regards to his future apt. Patient is feeling fine, and is having no complications. Patient wants to know if he needs a colonoscopy this year or next year. Told patient I will speak with Dr Ponce about this and have an answer for him soon.

## 2022-05-25 NOTE — TELEPHONE ENCOUNTER
Spoke with patient. Confirmed he is due for a colonoscopy now. Case request entered. Endoscopy schedulers contact # provided.

## 2022-10-28 DIAGNOSIS — Z87.19 HISTORY OF RECTAL POLYPS: Primary | ICD-10-CM

## 2023-09-12 ENCOUNTER — TELEPHONE (OUTPATIENT)
Dept: SURGERY | Facility: CLINIC | Age: 73
End: 2023-09-12
Payer: COMMERCIAL

## 2023-09-12 NOTE — TELEPHONE ENCOUNTER
"Spoke with patient. States he has been suffering with constipation for the last month. He states he was supposed to have a colonoscopy last year and when he spoke with the lady from endoscopy she said she would have to call him back and no one did. He "let it slip" and has been having some health issues that have taken priority. He was diagnosed with A Fib and was placed on a blood thinner and Sotalol. The sotalol decreased his heart rate below 40 bpm so the Dr replaced it with Flecainide. This is when he started experiencing constipation. He has been taking dulcolax and one dose of Miralax a day. Patient also began seeing blood in his urine, which was found to be caused by a kidney stone, he currently has had a urinary catheter bag since 7/28. The Dr discontinued the Eliquis on 9/7/23 to see if this would help the constipation per patient. It has not helped. Offered sooner appt with NP with GI experience, patient accepted. Encouraged patient to increase Miralax to 2 times a day in the meantime, but instructed to drink 8-8oz glasses of water a least a day. Patient verbalized understanding.   "

## 2023-09-14 ENCOUNTER — TELEPHONE (OUTPATIENT)
Dept: ENDOSCOPY | Facility: HOSPITAL | Age: 73
End: 2023-09-14
Payer: COMMERCIAL

## 2023-09-14 ENCOUNTER — OFFICE VISIT (OUTPATIENT)
Dept: SURGERY | Facility: CLINIC | Age: 73
End: 2023-09-14
Payer: COMMERCIAL

## 2023-09-14 VITALS
DIASTOLIC BLOOD PRESSURE: 74 MMHG | RESPIRATION RATE: 16 BRPM | HEART RATE: 69 BPM | WEIGHT: 184.75 LBS | SYSTOLIC BLOOD PRESSURE: 146 MMHG | BODY MASS INDEX: 26.45 KG/M2 | HEIGHT: 70 IN

## 2023-09-14 DIAGNOSIS — Z86.010 HISTORY OF COLON POLYPS: Primary | ICD-10-CM

## 2023-09-14 DIAGNOSIS — K59.09 OTHER CONSTIPATION: Primary | ICD-10-CM

## 2023-09-14 PROCEDURE — 3008F BODY MASS INDEX DOCD: CPT | Mod: CPTII,S$GLB,, | Performed by: NURSE PRACTITIONER

## 2023-09-14 PROCEDURE — 3288F PR FALLS RISK ASSESSMENT DOCUMENTED: ICD-10-PCS | Mod: CPTII,S$GLB,, | Performed by: NURSE PRACTITIONER

## 2023-09-14 PROCEDURE — 3008F PR BODY MASS INDEX (BMI) DOCUMENTED: ICD-10-PCS | Mod: CPTII,S$GLB,, | Performed by: NURSE PRACTITIONER

## 2023-09-14 PROCEDURE — 4010F PR ACE/ARB THEARPY RXD/TAKEN: ICD-10-PCS | Mod: CPTII,S$GLB,, | Performed by: NURSE PRACTITIONER

## 2023-09-14 PROCEDURE — 99999 PR PBB SHADOW E&M-EST. PATIENT-LVL IV: ICD-10-PCS | Mod: PBBFAC,,, | Performed by: NURSE PRACTITIONER

## 2023-09-14 PROCEDURE — 4010F ACE/ARB THERAPY RXD/TAKEN: CPT | Mod: CPTII,S$GLB,, | Performed by: NURSE PRACTITIONER

## 2023-09-14 PROCEDURE — 1157F PR ADVANCE CARE PLAN OR EQUIV PRESENT IN MEDICAL RECORD: ICD-10-PCS | Mod: CPTII,S$GLB,, | Performed by: NURSE PRACTITIONER

## 2023-09-14 PROCEDURE — 46600 DIAGNOSTIC ANOSCOPY SPX: CPT | Mod: S$GLB,,, | Performed by: NURSE PRACTITIONER

## 2023-09-14 PROCEDURE — 1159F PR MEDICATION LIST DOCUMENTED IN MEDICAL RECORD: ICD-10-PCS | Mod: CPTII,S$GLB,, | Performed by: NURSE PRACTITIONER

## 2023-09-14 PROCEDURE — 1159F MED LIST DOCD IN RCRD: CPT | Mod: CPTII,S$GLB,, | Performed by: NURSE PRACTITIONER

## 2023-09-14 PROCEDURE — 99999 PR PBB SHADOW E&M-EST. PATIENT-LVL IV: CPT | Mod: PBBFAC,,, | Performed by: NURSE PRACTITIONER

## 2023-09-14 PROCEDURE — 3078F PR MOST RECENT DIASTOLIC BLOOD PRESSURE < 80 MM HG: ICD-10-PCS | Mod: CPTII,S$GLB,, | Performed by: NURSE PRACTITIONER

## 2023-09-14 PROCEDURE — 3077F SYST BP >= 140 MM HG: CPT | Mod: CPTII,S$GLB,, | Performed by: NURSE PRACTITIONER

## 2023-09-14 PROCEDURE — 99214 OFFICE O/P EST MOD 30 MIN: CPT | Mod: 25,S$GLB,, | Performed by: NURSE PRACTITIONER

## 2023-09-14 PROCEDURE — 3288F FALL RISK ASSESSMENT DOCD: CPT | Mod: CPTII,S$GLB,, | Performed by: NURSE PRACTITIONER

## 2023-09-14 PROCEDURE — 1157F ADVNC CARE PLAN IN RCRD: CPT | Mod: CPTII,S$GLB,, | Performed by: NURSE PRACTITIONER

## 2023-09-14 PROCEDURE — 46600 PR DIAG2STIC A2SCOPY: ICD-10-PCS | Mod: S$GLB,,, | Performed by: NURSE PRACTITIONER

## 2023-09-14 PROCEDURE — 3077F PR MOST RECENT SYSTOLIC BLOOD PRESSURE >= 140 MM HG: ICD-10-PCS | Mod: CPTII,S$GLB,, | Performed by: NURSE PRACTITIONER

## 2023-09-14 PROCEDURE — 1101F PT FALLS ASSESS-DOCD LE1/YR: CPT | Mod: CPTII,S$GLB,, | Performed by: NURSE PRACTITIONER

## 2023-09-14 PROCEDURE — 3078F DIAST BP <80 MM HG: CPT | Mod: CPTII,S$GLB,, | Performed by: NURSE PRACTITIONER

## 2023-09-14 PROCEDURE — 1126F PR PAIN SEVERITY QUANTIFIED, NO PAIN PRESENT: ICD-10-PCS | Mod: CPTII,S$GLB,, | Performed by: NURSE PRACTITIONER

## 2023-09-14 PROCEDURE — 1126F AMNT PAIN NOTED NONE PRSNT: CPT | Mod: CPTII,S$GLB,, | Performed by: NURSE PRACTITIONER

## 2023-09-14 PROCEDURE — 99214 PR OFFICE/OUTPT VISIT, EST, LEVL IV, 30-39 MIN: ICD-10-PCS | Mod: 25,S$GLB,, | Performed by: NURSE PRACTITIONER

## 2023-09-14 PROCEDURE — 1101F PR PT FALLS ASSESS DOC 0-1 FALLS W/OUT INJ PAST YR: ICD-10-PCS | Mod: CPTII,S$GLB,, | Performed by: NURSE PRACTITIONER

## 2023-09-14 RX ORDER — FLECAINIDE ACETATE 50 MG/1
50 TABLET ORAL EVERY 12 HOURS
COMMUNITY

## 2023-09-14 NOTE — PATIENT INSTRUCTIONS
Miralax 1 capful twice a day  Take 1-3 tablets of metamucil a day  64 oz of water per day    Women need 25 grams of fiber per day, and men need about 38 grams per day, according to the [employer].    If you're not meeting your recommended daily dose of fiber via food, fiber supplementation is beneficial in helping meet those daily recommendations. Be sure to drink plenty of water while taking fiber supplementation. Make sure to read fiber supplementation drug label directions regarding when and how to take the supplementation.    Dietary fiber content of frequently consumed foods  Food Fiber, g/serving   Fruits   Apple (with skin) 3.5/1 medium-sized apple   Apricot (fresh) 1.8/3 apricots   Banana 2.5/1 banana   Cantaloupe 2.7/half edible portion   Dates 13.5/1 cup (chopped)   Grapefruit 1.6/half edible portion   Grapes 2.6/10 grapes   Oranges 2.6/1 orange   Peach (with skin) 2.1/1 peach   Pear (with skin) 4.6/1 pear   Pineapple 2.2/1 cup (diced)   Prunes 11.9/11 dried prunes   Raisins 2.2/packet   Strawberries 3.0/1 cup   Juices   Apple 0.74/1 cup   Grapefruit 1.0/1 cup   Grape 1.3/1 cup   Orange 1.0/1 cup   Vegetables   Cooked   Asparagus 1.5/7 davis   Beans, string, green 3.4/1 cup   Broccoli 5.0/1 stalk   [place] sprouts 4.6/7-8 sprouts   Cabbage 2.9/1 cup (cooked)   Carrots 4.6/1 cup   Cauliflower 2.1/1 cup   Peas 7.2/1 cup (cooked)   Potato (with skin) 2.3/1 boiled   Spinach 4.1/1 cup (raw)   Squash, summer 3.4/1 cup (cooked, diced)   Sweet potatoes 2.7/1 baked   Zucchini 4.2/1 cup (cooked, diced)   Raw   Cucumber 0.2/6-8 slices with skin   Lettuce 2.0/1 wedge iceberg   Mushrooms 0.8/half cup (sliced)   Onions 1.3/1 cup   Peppers, green 1.0/1 pod   Tomato 1.8/1 tomato   Spinach 8.0/1 cup (chopped)   Legumes   Baked beans 18.6/1 cup   Dried peas 4.7/half cup (cooked)   Kidney beans 7.4/half cup (cooked)   Lima beans 2.6/half cup (cooked)   Lentils 1.9/half cup (cooked)   Breads, pastas, and flours    Bagels 1.1/half bagel   Bran muffins 6.3/muffin   Cracked wheat 4.1/slice   Oatmeal 5.3/1 cup   Pumpernickel bread 1.0/slice   White bread 0.55/slice   Whole-wheat bread 1.66/slice   Pasta and rice cooked   Macaroni 1.0/1 cup (cooked)   Rice, brown 2.4/1 cup (cooked)   Rice, polished 0.6/1 cup (cooked)   Spaghetti (regular) 1.0/1 cup (cooked)   Flours and grains   Bran, oat 8.3/oz   Bran, wheat 12.4/oz   Rolled oats 13.7/1 cup (cooked)   Nuts   Almonds 3.6/half cup (slivered)   Peanuts 11.7/1 cup

## 2023-09-14 NOTE — TELEPHONE ENCOUNTER
Spoke to patient to schedule procedure(s) Colonoscopy       Physician to perform procedure(s) Dr. NASRIN Ponce  Date of Procedure (s) 10/27/23  Arrival Time 6:40 AM  Time of Procedure(s) 7:40 AM   Location of Procedure(s) [place] [address]  Type of Rx Prep sent to patient: Miralax extended  Instructions provided to patient via [facility] and handed to patient    Patient was informed on the following information and verbalized understanding. Screening questionnaire reviewed with patient and complete. If procedure requires anesthesia, a responsible adult needs to be present to accompany the patient home, patient cannot drive after receiving anesthesia. Appointment details are tentative, especially check-in time. Patient will receive a prep-op call 4 days prior to confirm check-in time for procedure. If applicable the patient should contact their pharmacy to verify Rx for procedure prep is ready for pick-up. Patient was advised to call the scheduling department at [phone] if pharmacy states no Rx is available. Patient was advised to call the endoscopy scheduling department if any questions or concerns arise.      [facility] Scheduling Department

## 2023-09-14 NOTE — TELEPHONE ENCOUNTER
"----- Message from Naina Narayanan NP sent at 2023 12:52 PM CDT -----  Procedure: Colonoscopy    Diagnosis: Surveillance colonoscopy - Hx of colon polyps    Procedure Timin-4 weeks    #If within 4 weeks selected, please jair as high priority#    #If greater than 12 weeks, please select "5-12 weeks" and delay sending until 2 months prior to requested date#     Provider: madhuri     Location: No Preference    Additional Scheduling Information: Blood thinners    Prep Specifications:Extended/Constipation prep    Is the patient taking a GLP-1 Agonist:no    Have you attached a patient to this message: yes     "

## 2023-09-14 NOTE — PROGRESS NOTES
CRS Office Visit History and Physical    Referring Md:   No referring provider defined for this encounter.    SUBJECTIVE:     2021 w Dr. Ponce HPI  HPI established patient.  Mr. 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  This was a recurrent lesion.  His last clinic visit was Jan 2020 at which time he had no evidence of recurrence.    no colorectal symptoms at this time. Last colonoscopy - June 2018.    Interval Hx  w Oracio NP 9/14/2023    History of Present Illness:  The patient is established patient to this practice. Here w wife  Course is as follows:  Patient is a 73 y.o. male presents with constipation. He is over due for c scope. Here w concern of starting new cardiac meds that caused constipation.  He also has lost weight  Has a lot of stressors, currently wearing joseph cath  Decrease in appetite, denies 64 oz of water per day  Has only tried miralax 1 capful daily without good response, then will have to take dulcolax and fleets  Denies bleeding, pain, mucous in stools        Last Colonoscopy: 2018 One 30 mm, non-bleeding polyp in the rectum.                         Posterior -adjacent to scar from prior TAE - soft,                         without induration or ulceration. Starts near                         dentate line.                         - No specimens collected.   Family history of colorectal cancer or IBD: no.    Review of patient's allergies indicates:   Allergen Reactions    Codeine Itching       Past Medical History:   Diagnosis Date    BPH (benign prostatic hyperplasia)     General anesthetics causing adverse effect in therapeutic use     last awhile for orientation to fully comeback    Hypertension     Inguinal hernia unilateral, non-recurrent      Past Surgical History:   Procedure Laterality Date    COLONOSCOPY N/A 6/21/2018    Procedure: COLONOSCOPY;  Surgeon: Medhat Ponce MD;  Location: [facility] ([address] [place] [address]);  Service: Endoscopy;   "Laterality: N/A;  miralax prep (past preps miralax can not tolerate other preps)    HERNIA REPAIR      TONSILLECTOMY      TRANSANAL RECTAL RESECTION  7/10/2018    Procedure: EXCISION, LESION, RECTUM, TRANSANAL APPROACH;  Surgeon: Medhat Ponce MD;  Location: [facility] OR [address] [place] [address];  Service: Colon and Rectal;;    TUMOR EXCISION      rectum     History reviewed. No pertinent family history.  Social History     Tobacco Use    Smoking status: Never    Smokeless tobacco: Never   Substance Use Topics    Alcohol use: Yes     Comment: once a month    Drug use: No        Review of Systems:  ROS    OBJECTIVE:     Vital Signs (Most Recent)  BP (!) 146/74 (BP Location: Left arm, Patient Position: Sitting, BP Method: Large (Automatic))   Pulse 69   Resp 16   Ht 5' 10" (1.778 m)   Wt 83.8 kg (184 lb 11.9 oz)   BMI 26.51 kg/m²     Physical Exam:  General: White male in no distress   Neuro: Alert and oriented to person, place, and time.  Moves all extremities.     HEENT: No icterus.  Trachea midline  Respiratory: Respirations are even and unlabored, no cough or audible wheezing  Skin: Warm dry and intact, No visible rashes, no jaundice    Labs reviewed today:  Lab Results   Component Value Date    WBC 6.48 02/17/2012    HGB 15.3 07/02/2018    HCT 44.0 07/02/2018     02/17/2012     02/17/2012    K 4.0 02/17/2012     02/17/2012    CREATININE 0.9 02/17/2012    BUN 17 02/17/2012    CO2 25 02/17/2012    INR 0.9 07/09/2007         Endoscopy reviewed today:  See HPI    Anorectal Exam:    Anal Skin: WNL    Digital Rectal Exam:  Resting Tone normal  Mass none  Tenderness  present    Anoscopy:  Verbal consent was obtained.   Clear plastic anoscope inserted.    Hemorrhoids  present, possible small polyp R anterior   Stigmata of bleeding  absent  Stigmata of prolapsed  absent  Distal rectal mucosa normal      ASSESSMENT/PLAN:     Diagnoses and all orders for this visit:    Other constipation      73 y.o. M s/p transanal " excision of a rectal villous tumor in July 2018. F/u flex sigs in offic with madhuri over the years without recurrence.  Overdue for c scope  Now has new onset constipation. This could however be related to new stressors related to cardiac and kidney health.     The patient was instructed to:  Increase water intake to at least 8-10 glasses of water per day.  Take a daily fiber supplement (Konsyl, Benefiber, Metamucil) and increase dietary intake to 20-30 grams/day.  Avoid straining or spending >5min/event with bowel movements.  Miralax 2 capfuls daily  Colonoscopy next available    Follow-up in clinic PRN    Naina Narayanan, LAST-C  Colon and Rectal Surgery

## 2023-09-15 ENCOUNTER — TELEPHONE (OUTPATIENT)
Dept: ENDOSCOPY | Facility: HOSPITAL | Age: 73
End: 2023-09-15
Payer: COMMERCIAL

## 2023-09-15 NOTE — TELEPHONE ENCOUNTER
----- Message from Calderon Hodge RN sent at 9/14/2023  3:10 PM CDT -----  Regarding: 10/27 CL  The patient is currently under an external cardiologist Dr. Fe Long care and requires a clearance Cardiology for their upcoming scheduled Colonoscopy on 10/27/23.           External provider information:    Physician name: Dr. Fe Long  Facility/Location: [facility] in [place], [place]  Phone number: [phone]

## 2023-10-27 ENCOUNTER — ANESTHESIA EVENT (OUTPATIENT)
Dept: ENDOSCOPY | Facility: HOSPITAL | Age: 73
End: 2023-10-27
Payer: COMMERCIAL

## 2023-10-27 ENCOUNTER — HOSPITAL ENCOUNTER (OUTPATIENT)
Facility: HOSPITAL | Age: 73
Discharge: HOME | End: 2023-10-27
Attending: COLON & RECTAL SURGERY | Admitting: COLON & RECTAL SURGERY
Payer: COMMERCIAL

## 2023-10-27 ENCOUNTER — ANESTHESIA (OUTPATIENT)
Dept: ENDOSCOPY | Facility: HOSPITAL | Age: 73
End: 2023-10-27
Payer: COMMERCIAL

## 2023-10-27 VITALS
BODY MASS INDEX: 25.77 KG/M2 | HEIGHT: 70 IN | DIASTOLIC BLOOD PRESSURE: 73 MMHG | TEMPERATURE: 98 F | RESPIRATION RATE: 18 BRPM | SYSTOLIC BLOOD PRESSURE: 141 MMHG | WEIGHT: 180 LBS | OXYGEN SATURATION: 100 % | HEART RATE: 57 BPM

## 2023-10-27 DIAGNOSIS — Z87.19 HISTORY OF RECTAL POLYPS: ICD-10-CM

## 2023-10-27 DIAGNOSIS — Z12.11 SCREENING FOR COLON CANCER: Primary | ICD-10-CM

## 2023-10-27 PROCEDURE — 25000003 PHARM REV CODE 250: Performed by: COLON & RECTAL SURGERY

## 2023-10-27 PROCEDURE — G0105 COLORECTAL SCRN; HI RISK IND: HCPCS | Performed by: COLON & RECTAL SURGERY

## 2023-10-27 PROCEDURE — 25000003 PHARM REV CODE 250: Performed by: NURSE ANESTHETIST, CERTIFIED REGISTERED

## 2023-10-27 PROCEDURE — 63600175 PHARM REV CODE 636 W HCPCS: Performed by: NURSE ANESTHETIST, CERTIFIED REGISTERED

## 2023-10-27 PROCEDURE — G0105 COLORECTAL SCRN; HI RISK IND: ICD-10-PCS | Mod: ,,, | Performed by: COLON & RECTAL SURGERY

## 2023-10-27 PROCEDURE — 37000008 HC ANESTHESIA 1ST 15 MINUTES: Performed by: COLON & RECTAL SURGERY

## 2023-10-27 PROCEDURE — E9220 PRA ENDO ANESTHESIA: ICD-10-PCS | Mod: ,,, | Performed by: NURSE ANESTHETIST, CERTIFIED REGISTERED

## 2023-10-27 PROCEDURE — 37000009 HC ANESTHESIA EA ADD 15 MINS: Performed by: COLON & RECTAL SURGERY

## 2023-10-27 PROCEDURE — E9220 PRA ENDO ANESTHESIA: HCPCS | Mod: ,,, | Performed by: NURSE ANESTHETIST, CERTIFIED REGISTERED

## 2023-10-27 PROCEDURE — G0105 COLORECTAL SCRN; HI RISK IND: HCPCS | Mod: ,,, | Performed by: COLON & RECTAL SURGERY

## 2023-10-27 RX ORDER — LIDOCAINE HYDROCHLORIDE 20 MG/ML
INJECTION INTRAVENOUS
Status: DISCONTINUED | OUTPATIENT
Start: 2023-10-27 | End: 2023-10-27

## 2023-10-27 RX ORDER — PROPOFOL 10 MG/ML
VIAL (ML) INTRAVENOUS
Status: DISCONTINUED | OUTPATIENT
Start: 2023-10-27 | End: 2023-10-27

## 2023-10-27 RX ORDER — SODIUM CHLORIDE 9 MG/ML
INJECTION, SOLUTION INTRAVENOUS CONTINUOUS
Status: DISCONTINUED | OUTPATIENT
Start: 2023-10-27 | End: 2023-10-27 | Stop reason: HOSPADM

## 2023-10-27 RX ADMIN — SODIUM CHLORIDE: 0.9 INJECTION, SOLUTION INTRAVENOUS at 07:10

## 2023-10-27 RX ADMIN — PROPOFOL 60 MG: 10 INJECTION, EMULSION INTRAVENOUS at 07:10

## 2023-10-27 RX ADMIN — LIDOCAINE HYDROCHLORIDE 50 MG: 20 INJECTION INTRAVENOUS at 07:10

## 2023-10-27 NOTE — ANESTHESIA PREPROCEDURE EVALUATION
[facility]  Anesthesia Pre-Operative Evaluation         Patient Name: Rikki Mello  YOB: 1950  MRN: [id number]    SUBJECTIVE:     Pre-operative evaluation for Procedure(s) (LRB):  COLONOSCOPY (N/A)     10/27/2023    Rikki Mello is a 73 y.o. male w/ a significant PMHx of HTN, Afib on flecainide (Pt not on anticoagulation), BPH with indwelling joseph catheter, Kidney stones.    Patient now presents for the above procedure(s).      LDA: None documented.        Prev airway: None documented.    Drips: None documented.   sodium chloride 0.9%          Patient Active Problem List   Diagnosis    Screening for colon cancer    Rectal polyp       Review of patient's allergies indicates:   Allergen Reactions    Codeine Itching       Current Outpatient Medications:    Current Facility-Administered Medications:     0.9%  NaCl infusion, , Intravenous, Continuous, Medhat Ponce MD    Past Surgical History:   Procedure Laterality Date    COLONOSCOPY N/A 6/21/2018    Procedure: COLONOSCOPY;  Surgeon: Medaht Ponce MD;  Location: [facility] (4TH FLR);  Service: Endoscopy;  Laterality: N/A;  miralax prep (past preps miralax can not tolerate other preps)    HERNIA REPAIR      TONSILLECTOMY      TRANSANAL RECTAL RESECTION  7/10/2018    Procedure: EXCISION, LESION, RECTUM, TRANSANAL APPROACH;  Surgeon: Medhat Ponce MD;  Location: [facility] OR 2ND FLR;  Service: Colon and Rectal;;    TUMOR EXCISION      rectum       Social History     Socioeconomic History    Marital status:    Tobacco Use    Smoking status: Never    Smokeless tobacco: Never   Substance and Sexual Activity    Alcohol use: Yes     Comment: once a month    Drug use: No       OBJECTIVE:     Vital Signs Range (Last 24H):         Significant Labs:  Lab Results   Component Value Date    WBC 6.48 02/17/2012     HGB 15.3 07/02/2018    HCT 44.0 07/02/2018     02/17/2012     02/17/2012    K 4.0 02/17/2012     02/17/2012    CREATININE 0.9 02/17/2012    BUN 17 02/17/2012    CO2 25 02/17/2012    INR 0.9 07/09/2007       Diagnostic Studies: No relevant studies.    2D ECHO:  TTE: 1/29/2018  Impressions                                                             -----------                                                                                                                                      1.No hemodynamically significant valvular disease                                                                                                2.The estimated LV ejection fraction is 55 %                                                                                                      3.There is mild aortic valve sclerosis without significant stenosis                                                                              There are no prior studies for comparison.                                                                                                            LUCIA:  No results found for this or any previous visit.    ASSESSMENT/PLAN:         Pre-op Assessment    I have reviewed the Patient Summary Reports.     I have reviewed the Nursing Notes. I have reviewed the NPO Status.      Review of Systems  Anesthesia Hx:  No problems with previous Anesthesia  Denies Family Hx of Anesthesia complications.   Denies Personal Hx of Anesthesia complications.   Hematology/Oncology:  Hematology Normal   Oncology Normal     EENT/Dental:EENT/Dental Normal   Cardiovascular:   Hypertension Dysrhythmias atrial fibrillation    Pulmonary:  Pulmonary Normal    Renal/:   renal calculi BPH    Hepatic/GI:   Bowel Prep.    Musculoskeletal:  Musculoskeletal Normal    Neurological:  Neurology Normal    Endocrine:  Endocrine Normal    Dermatological:  Skin Normal    Psych:  Psychiatric Normal           Physical  Exam  General: Well nourished, Cooperative, Alert and Oriented    Airway:  Mallampati: II / II  Mouth Opening: Normal  TM Distance: Normal  Tongue: Normal    Dental:  Intact    Chest/Lungs:  Clear to auscultation, Normal Respiratory Rate    Heart:  Rate: Normal  Rhythm: Regular Rhythm        Anesthesia Plan  Type of Anesthesia, risks & benefits discussed:    Anesthesia Type: Gen Natural Airway, MAC  Intra-op Monitoring Plan: Standard ASA Monitors  Post Op Pain Control Plan: multimodal analgesia and IV/PO Opioids PRN  Induction:  IV  Informed Consent: Informed consent signed with the Patient and all parties understand the risks and agree with anesthesia plan.  All questions answered.   ASA Score: 2  Day of Surgery Review of History & Physical: H&P Update referred to the surgeon/provider.    Ready For Surgery From Anesthesia Perspective.     .

## 2023-10-27 NOTE — TRANSFER OF CARE
"Anesthesia Transfer of Care Note    Patient: Rikki Mello    Procedure(s) Performed: Procedure(s) (LRB):  COLONOSCOPY (N/A)    Patient location: GI    Anesthesia Type: general    Transport from OR: Transported from OR on room air with adequate spontaneous ventilation    Post pain: adequate analgesia    Post assessment: no apparent anesthetic complications and tolerated procedure well    Post vital signs: stable    Level of consciousness: awake    Nausea/Vomiting: no nausea/vomiting    Complications: none    Transfer of care protocol was followed      Last vitals:   Visit Vitals  BP (!) 159/79 (BP Location: Left arm, Patient Position: Lying)   Pulse 72   Temp 36.7 °C (98.1 °F) (Temporal)   Resp 16   Ht 5' 10" (1.778 m)   Wt 81.6 kg (180 lb)   SpO2 99%   BMI 25.83 kg/m²     "

## 2023-10-27 NOTE — H&P
Colonoscopy History and Physical      Procedure : Colonoscopy    Indications:  Mr. 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  This was a recurrent lesion.  His last clinic visit was Mar 55866 at which time he had no evidence of recurrence. No colorectal symptoms at this time    Family Hx of CRC: negative    Last Colonoscopy:  6/21/2018 (Medhat Ponce MD)  30mm rectal polyp found posterior-adjacent to scar from prior TAE    Past Medical History:   Diagnosis Date    BPH (benign prostatic hyperplasia)     General anesthetics causing adverse effect in therapeutic use     last awhile for orientation to fully comeback    Hypertension     Inguinal hernia unilateral, non-recurrent        History reviewed. No pertinent family history.    Social History     Socioeconomic History    Marital status:    Tobacco Use    Smoking status: Never    Smokeless tobacco: Never   Substance and Sexual Activity    Alcohol use: Yes     Comment: once a month    Drug use: No       Review of patient's allergies indicates:   Allergen Reactions    Codeine Itching       No current facility-administered medications on file prior to encounter.     Current Outpatient Medications on File Prior to Encounter   Medication Sig Dispense Refill    amlodipine-benazepril (LOTREL) 10-40 mg per capsule Take 1 capsule by mouth.      atorvastatin (LIPITOR) 20 MG tablet Take 20 mg by mouth.      flecainide (TAMBOCOR) 50 MG Tab Take 50 mg by mouth every 12 (twelve) hours.      sildenafil (REVATIO) 20 mg Tab Take 100 mg by mouth.      tamsulosin (FLOMAX) 0.4 mg Cap TAKE 1 CAPSULE (0.4 MG TOTAL) BY MOUTH 2 (TWO) TIMES DAILY.**[id number]         Review of Systems -   Respiratory ROS: negative  Cardiovascular ROS: negative  Gastrointestinal ROS: negative  Musculoskeletal ROS: negative  Neurological ROS: negative        Physical Exam:  General: no distress  Head: normocephalic  Lungs:  normal respiratory effort  Heart:  regular rate  Abdomen: soft,  Non-tender  Extremities: warm and well perfused       Deep Sedation: Mallampati Score per anesthesia    ASA: II      Patient cleared for Anesthesia:  MAC    Anesthesia/Surgery risks, benefits, and alternative options discussed and understood by patient/family.    Sean Hobbs MD  General Surgery PGY-1

## 2023-10-27 NOTE — ANESTHESIA POSTPROCEDURE EVALUATION
Anesthesia Post Evaluation    Patient: Rikki Mello    Procedure(s) Performed: Procedure(s) (LRB):  COLONOSCOPY (N/A)    Final Anesthesia Type: general      Patient location during evaluation: GI PACU  Patient participation: Yes- Able to Participate  Level of consciousness: awake and alert  Post-procedure vital signs: reviewed and stable  Pain management: adequate  Airway patency: patent  CARMEN mitigation strategies: Multimodal analgesia, Preoperative use of mandibular advancement devices or oral appliances and Intraoperative administration of CPAP, nasopharyngeal airway, or oral appliance during sedation  PONV status at discharge: No PONV  Anesthetic complications: no      Cardiovascular status: blood pressure returned to baseline, hemodynamically stable and stable  Respiratory status: unassisted and spontaneous ventilation  Hydration status: euvolemic  Follow-up not needed.          Vitals Value Taken Time   /68 10/27/23 0819   Temp  10/27/23 0831   Pulse 70 10/27/23 0819   Resp 18 10/27/23 0819   SpO2 99 % 10/27/23 0819         No case tracking events are documented in the log.      Pain/Tawny Score: No data recorded       Undermining Type: Entire Wound

## 2023-10-27 NOTE — PROVATION PATIENT INSTRUCTIONS
Discharge Summary/Instructions after an Endoscopic Procedure  Patient Name: Rikki Mello  Patient MRN: [id number]  Patient YOB: 1950 Friday, October 27, 2023  Medhat Ponce MD  Dear patient,  As a result of recent federal legislation (The Federal Cures Act), you may   receive lab or pathology results from your procedure in your [facility]   account before your physician is able to contact you. Your physician or   their representative will relay the results to you with their   recommendations at their soonest availability.  Thank you,  RESTRICTIONS:  During your procedure today, you received medications for sedation.  These   medications may affect your judgment, balance and coordination.  Therefore,   for 24 hours, you have the following restrictions:   - DO NOT drive a car, operate machinery, make legal/financial decisions,   sign important papers or drink alcohol.    ACTIVITY:  Today: no heavy lifting, straining or running due to procedural   sedation/anesthesia.  The following day: return to full activity including work.  DIET:  Eat and drink normally unless instructed otherwise.     TREATMENT FOR COMMON SIDE EFFECTS:  - Mild abdominal pain, nausea, belching, bloating or excessive gas:  rest,   eat lightly and use a heating pad.  - Sore Throat: treat with throat lozenges and/or gargle with warm salt   water.  - Because air was used during the procedure, expelling large amounts of air   from your rectum or belching is normal.  - If a bowel prep was taken, you may not have a bowel movement for 1-3 days.    This is normal.  SYMPTOMS TO WATCH FOR AND REPORT TO YOUR PHYSICIAN:  1. Abdominal pain or bloating, other than gas cramps.  2. Chest pain.  3. Back pain.  4. Signs of infection such as: chills or fever occurring within 24 hours   after the procedure.  5. Rectal bleeding, which would show as bright red, maroon, or black stools.   (A tablespoon of blood from the rectum is not serious, especially  if   hemorrhoids are present.)  6. Vomiting.  7. Weakness or dizziness.  GO DIRECTLY TO THE NEAREST EMERGENCY ROOM IF YOU HAVE ANY OF THE FOLLOWING:      Difficulty breathing              Chills and/or fever over 101 F   Persistent vomiting and/or vomiting blood   Severe abdominal pain   Severe chest pain   Black, tarry stools   Bleeding- more than one tablespoon   Any other symptom or condition that you feel may need urgent attention  Your doctor recommends these additional instructions:  If any biopsies were taken, your doctors clinic will contact you in 1 to 2   weeks with any results.  - Discharge patient to home (ambulatory).   - Patient has a contact number available for emergencies.  The signs and   symptoms of potential delayed complications were discussed with the   patient.  Return to normal activities tomorrow.  Written discharge   instructions were provided to the patient.   - Resume previous diet.   - Continue present medications.   - Return to primary care physician as previously scheduled.   - Repeat colonoscopy in 5 years for surveillance.  For questions, problems or results please call your physician - Medhat Ponce MD at Work:  [phone].  [facility] [place], EMERGENCY ROOM PHONE NUMBER: [phone]  IF A COMPLICATION OR EMERGENCY SITUATION ARISES AND YOU ARE UNABLE TO REACH   YOUR PHYSICIAN - GO DIRECTLY TO THE EMERGENCY ROOM.  Medhat Ponce MD  10/27/2023 8:17:04 AM  This report has been verified and signed electronically.  Dear patient,  As a result of recent federal legislation (The Federal Cures Act), you may   receive lab or pathology results from your procedure in your [facility]   account before your physician is able to contact you. Your physician or   their representative will relay the results to you with their   recommendations at their soonest availability.  Thank you,  PROVATION

## 2024-01-10 ENCOUNTER — OFFICE VISIT (OUTPATIENT)
Dept: UROLOGY | Facility: CLINIC | Age: 74
End: 2024-01-10
Payer: COMMERCIAL

## 2024-01-10 VITALS
HEIGHT: 70 IN | SYSTOLIC BLOOD PRESSURE: 139 MMHG | HEART RATE: 64 BPM | DIASTOLIC BLOOD PRESSURE: 75 MMHG | BODY MASS INDEX: 25.77 KG/M2 | WEIGHT: 180 LBS

## 2024-01-10 DIAGNOSIS — R33.9 RETENTION OF URINE: Primary | ICD-10-CM

## 2024-01-10 DIAGNOSIS — N30.00 ACUTE CYSTITIS WITHOUT HEMATURIA: ICD-10-CM

## 2024-01-10 PROCEDURE — 87088 URINE BACTERIA CULTURE: CPT | Performed by: UROLOGY

## 2024-01-10 PROCEDURE — 99999 PR PBB SHADOW E&M-EST. PATIENT-LVL III: CPT | Mod: PBBFAC,,, | Performed by: UROLOGY

## 2024-01-10 PROCEDURE — 1101F PT FALLS ASSESS-DOCD LE1/YR: CPT | Mod: CPTII,S$GLB,, | Performed by: UROLOGY

## 2024-01-10 PROCEDURE — 87077 CULTURE AEROBIC IDENTIFY: CPT | Performed by: UROLOGY

## 2024-01-10 PROCEDURE — 87186 SC STD MICRODIL/AGAR DIL: CPT | Performed by: UROLOGY

## 2024-01-10 PROCEDURE — 3075F SYST BP GE 130 - 139MM HG: CPT | Mod: CPTII,S$GLB,, | Performed by: UROLOGY

## 2024-01-10 PROCEDURE — 1157F ADVNC CARE PLAN IN RCRD: CPT | Mod: CPTII,S$GLB,, | Performed by: UROLOGY

## 2024-01-10 PROCEDURE — 1159F MED LIST DOCD IN RCRD: CPT | Mod: CPTII,S$GLB,, | Performed by: UROLOGY

## 2024-01-10 PROCEDURE — 87086 URINE CULTURE/COLONY COUNT: CPT | Performed by: UROLOGY

## 2024-01-10 PROCEDURE — 3288F FALL RISK ASSESSMENT DOCD: CPT | Mod: CPTII,S$GLB,, | Performed by: UROLOGY

## 2024-01-10 PROCEDURE — 3008F BODY MASS INDEX DOCD: CPT | Mod: CPTII,S$GLB,, | Performed by: UROLOGY

## 2024-01-10 PROCEDURE — 99204 OFFICE O/P NEW MOD 45 MIN: CPT | Mod: S$GLB,,, | Performed by: UROLOGY

## 2024-01-10 PROCEDURE — 3078F DIAST BP <80 MM HG: CPT | Mod: CPTII,S$GLB,, | Performed by: UROLOGY

## 2024-01-10 NOTE — PROGRESS NOTES
Subjective:       Patient ID: Rikki Mello is a 73 y.o. male.    Chief Complaint: Nephrolithiasis (/Pt here for stones management. )    HPI patient with a long history of urinary retention.  He has an indwelling Ritter.  He has his catheter changed once a month by Dr. Carrillo in [place].  Patient had a Uro lift done by Dr. Get Grande and he started having some problems he has multiple bladder stones and persistent urinary retention patient needs a complete BPH workup    Past Medical History:   Diagnosis Date    BPH (benign prostatic hyperplasia)     General anesthetics causing adverse effect in therapeutic use     last awhile for orientation to fully comeback    Hypertension     Inguinal hernia unilateral, non-recurrent        Past Surgical History:   Procedure Laterality Date    COLONOSCOPY N/A 6/21/2018    Procedure: COLONOSCOPY;  Surgeon: Medhat Ponce MD;  Location: [facility] (4TH FLR);  Service: Endoscopy;  Laterality: N/A;  miralax prep (past preps miralax can not tolerate other preps)    COLONOSCOPY N/A 10/27/2023    Procedure: COLONOSCOPY;  Surgeon: Medhat Ponce MD;  Location: [facility] (4TH FLR);  Service: Endoscopy;  Laterality: N/A;  pt states currently not taking Eliquis-informed to call [phone] if restarts  Prep Specifications:Extended/Constipation prep  ok per Paula Damon to use to slot  9/14 ref Naina Narayanan NP, miralax extended, instr. handed to patient-  cardiology clearance fro    HERNIA REPAIR      TONSILLECTOMY      TRANSANAL RECTAL RESECTION  7/10/2018    Procedure: EXCISION, LESION, RECTUM, TRANSANAL APPROACH;  Surgeon: Medhat Ponce MD;  [facility];  Service: Colon and Rectal;;    TUMOR EXCISION      rectum       No family history on file.    Social History     Socioeconomic History    Marital status:    Tobacco Use    Smoking status: Never    Smokeless tobacco: Never   Substance and Sexual Activity    Alcohol use: Yes      Comment: once a month    Drug use: No       Allergies:  Codeine    Medications:    Current Outpatient Medications:     amlodipine-benazepril (LOTREL) 10-40 mg per capsule, Take 1 capsule by mouth., Disp: , Rfl:     atorvastatin (LIPITOR) 20 MG tablet, Take 20 mg by mouth., Disp: , Rfl:     flecainide (TAMBOCOR) 50 MG Tab, Take 50 mg by mouth every 12 (twelve) hours., Disp: , Rfl:     sildenafil (REVATIO) 20 mg Tab, Take 100 mg by mouth., Disp: , Rfl:     tamsulosin (FLOMAX) 0.4 mg Cap, TAKE 1 CAPSULE (0.4 MG TOTAL) BY MOUTH 2 (TWO) TIMES DAILY.**[id number], Disp: , Rfl:     Review of Systems    Objective:      Physical Exam  Genitourinary:     Comments: Ritter in place.  Rectal deferred to time of workup        Assessment:       1. Retention of urine    2. Acute cystitis without hematuria        Plan:       Rikki was seen today for nephrolithiasis.    Diagnoses and all orders for this visit:    Retention of urine  -     Urine culture; Future  -     Transrectal ultrasound; Future  -     Simple urodynamics; Future  -     Cystoscopy; Future    Acute cystitis without hematuria  -     Urine culture; Future

## 2024-02-08 ENCOUNTER — PROCEDURE VISIT (OUTPATIENT)
Dept: UROLOGY | Facility: CLINIC | Age: 74
End: 2024-02-08
Payer: COMMERCIAL

## 2024-02-08 VITALS
HEIGHT: 70 IN | DIASTOLIC BLOOD PRESSURE: 77 MMHG | TEMPERATURE: 98 F | BODY MASS INDEX: 25.77 KG/M2 | WEIGHT: 180 LBS | SYSTOLIC BLOOD PRESSURE: 166 MMHG | HEART RATE: 62 BPM

## 2024-02-08 DIAGNOSIS — R33.9 RETENTION OF URINE: Primary | ICD-10-CM

## 2024-02-08 DIAGNOSIS — R33.9 RETENTION OF URINE: ICD-10-CM

## 2024-02-15 ENCOUNTER — TELEPHONE (OUTPATIENT)
Dept: UROLOGY | Facility: CLINIC | Age: 74
End: 2024-02-15
Payer: COMMERCIAL

## 2024-02-15 NOTE — TELEPHONE ENCOUNTER
----- Message from Yolanda Donaldson LPN sent at 2/14/2024 12:22 PM CST -----  Regarding: FW: Rx concerns  Contact: Ketty (Spouse) @ [phone]  I dont see a urine cx-just the order   ----- Message -----  From: Fei Campbell  Sent: 2/14/2024   9:50 AM CST  To: Juliet CERVANTES Jr Staff  Subject: Rx concerns                                      Pts wife Ketty is calling in because they still haven't received a prescription for the pts UTI. Pts wife states that it has been a week without any medication. Pts wife is also wanting to know if they should still come for the procedure on Tuesday or not and also is the one from last Thursday going to be margraita. Pts wife would like to speak with someone today.       Pharmacy:   [id number] - [place], [place] - [address]  [address]  [place] [address]  Phone: [phone] Fax: [phone]

## 2024-02-15 NOTE — TELEPHONE ENCOUNTER
Pt notified that there was a problem with the urine and the culture had to be cancelled. Pt has a urologist at home and will call today to see if they can get a culture done or I will order one at the local hospital, but pt does not know the name of the hospital. Dr reid called in a rx for cipro and we will re-evaluate possibility of cysto/trus on Tuesday 2-20-24

## 2024-02-19 ENCOUNTER — TELEPHONE (OUTPATIENT)
Dept: UROLOGY | Facility: CLINIC | Age: 74
End: 2024-02-19
Payer: COMMERCIAL

## 2024-02-19 DIAGNOSIS — R33.9 RETENTION OF URINE: Primary | ICD-10-CM

## 2024-02-19 NOTE — TELEPHONE ENCOUNTER
Spoke to  Rikki who reports SHARON Henning, is in process of getting culture results from clinic in [place].  Let him know if he needs another culture, there is an order for him to have it done with any [facility] lab.  He verbalizes understanding and will wait to hear from Milady regarding procedure scheduled for tomorrow.

## 2024-02-19 NOTE — TELEPHONE ENCOUNTER
Culture done by dr jefferson office. Results were faxed to us and will appear in media. Culture shows MRSA sensitive to cipro. Pt has been on cipro x 4 days and will come in for cysto trus tomorrow. Dr reid notified

## 2024-02-20 ENCOUNTER — PROCEDURE VISIT (OUTPATIENT)
Dept: UROLOGY | Facility: CLINIC | Age: 74
End: 2024-02-20
Payer: COMMERCIAL

## 2024-02-20 VITALS
BODY MASS INDEX: 27.2 KG/M2 | HEART RATE: 56 BPM | WEIGHT: 189.63 LBS | RESPIRATION RATE: 16 BRPM | SYSTOLIC BLOOD PRESSURE: 163 MMHG | DIASTOLIC BLOOD PRESSURE: 76 MMHG | TEMPERATURE: 98 F

## 2024-02-20 DIAGNOSIS — R33.9 RETENTION OF URINE: ICD-10-CM

## 2024-02-20 DIAGNOSIS — R33.9 RETENTION OF URINE: Primary | ICD-10-CM

## 2024-02-20 PROCEDURE — 76872 US TRANSRECTAL: CPT | Mod: S$GLB,,,

## 2024-02-20 PROCEDURE — 52000 CYSTOURETHROSCOPY: CPT | Mod: S$GLB,,,

## 2024-02-20 RX ORDER — LIDOCAINE HYDROCHLORIDE 20 MG/ML
JELLY TOPICAL
Status: COMPLETED | OUTPATIENT
Start: 2024-02-20 | End: 2024-02-20

## 2024-02-20 RX ORDER — LIDOCAINE HYDROCHLORIDE 20 MG/ML
JELLY TOPICAL
Status: SHIPPED | OUTPATIENT
Start: 2024-02-20

## 2024-02-20 RX ADMIN — LIDOCAINE HYDROCHLORIDE: 20 JELLY TOPICAL at 01:02

## 2024-02-20 NOTE — PROCEDURES
"Rikki Mello is a 74 y.o. male patient.            Transrectal ultrasound    Date/Time: 2/20/2024 2:03 PM    Performed by: Duong Herrera MD  Authorized by: Lamont Chung Jr., MD    Consent Done?:  Yes (Written)  Time out: Immediately prior to procedure a "time out" was called to verify the correct patient, procedure, equipment, support staff and site/side marked as required.    Indications comment:  Bph  Preparation: Patient was prepped and draped in usual sterile fashion    Position:  Left lateral  Patient sedated: No    Prostate Size:  20cc  Lesions:: No      Patient tolerance:  Patient tolerated the procedure well with no immediate complications     Large median lobe noted       2/20/2024    "

## 2024-02-20 NOTE — PATIENT INSTRUCTIONS
What to Expect After a Cystoscopy  For the next 24-48 hours, you may feel a mild burning when you urinate. This burning is normal and expected. Drink plenty of water to dilute the urine to help relieve the burning sensation. You may also see a small amount of blood in your urine after the procedure.    Unless you are already taking antibiotics, you may be given an antibiotic after the test to prevent infection.    Signs and Symptoms to Report  Call the [facility] at [phone] if you develop any of the following:  Fever of 101 degrees or higher  Chills or persistent bleeding  Inability to urinate

## 2024-02-20 NOTE — PROCEDURES
Rikki Mello is a 74 y.o. male patient.    Temp: 97.7 °F (36.5 °C) (02/20/24 1318)  Pulse: (!) 56 (02/20/24 1318)  Resp: 16 (02/20/24 1318)  BP: (!) 163/76 (02/20/24 1318)  Weight: 86 kg (189 lb 9.5 oz) (02/20/24 1318)       Cystoscopy    Date/Time: 2/20/2024 2:01 PM    Performed by: Duong Herrera MD  Authorized by: Lamont Chung Jr., MD    Consent Done?:  Yes (Written)  Timeout: prior to procedure the correct patient, procedure, and site was verified    Prep: patient was prepped and draped in usual sterile fashion    Local anesthesia used?: No    Indications: BPH    Position:  Supine  Patient sedated?: No    Preparation: Patient was prepped and draped in usual sterile fashion  No  Transrectal Ultrasound utilized: Yes  Stent inserted: No    Stent removed: No    External exam normal: Yes    Digital exam performed: No    Urethra normal: Yes    Prostate normal: No     Hyperplasia   Trilobar  Bladder neck normal: Yes    Bladder normal: No     patient tolerated the procedure well with no immediate complications  Comments:      Large median lobe, highly trabeculated bladder, bladder stones       2/20/2024

## 2024-02-29 ENCOUNTER — PROCEDURE VISIT (OUTPATIENT)
Dept: UROLOGY | Facility: CLINIC | Age: 74
End: 2024-02-29
Payer: COMMERCIAL

## 2024-02-29 VITALS
BODY MASS INDEX: 27.06 KG/M2 | HEART RATE: 62 BPM | DIASTOLIC BLOOD PRESSURE: 76 MMHG | WEIGHT: 189 LBS | HEIGHT: 70 IN | SYSTOLIC BLOOD PRESSURE: 172 MMHG | TEMPERATURE: 98 F

## 2024-02-29 DIAGNOSIS — N13.8 BPH WITH URINARY OBSTRUCTION: Primary | ICD-10-CM

## 2024-02-29 DIAGNOSIS — R33.9 RETENTION OF URINE: ICD-10-CM

## 2024-02-29 DIAGNOSIS — N40.1 BPH WITH URINARY OBSTRUCTION: Primary | ICD-10-CM

## 2024-02-29 PROCEDURE — 51784 ANAL/URINARY MUSCLE STUDY: CPT | Mod: 26,51,S$GLB, | Performed by: UROLOGY

## 2024-02-29 PROCEDURE — 51726 COMPLEX CYSTOMETROGRAM: CPT | Mod: 26,S$GLB,, | Performed by: UROLOGY

## 2024-02-29 NOTE — PATIENT INSTRUCTIONS
_                                                                                                                                                                                             If any problems after hours or weekends, you may call [phone] and ask for the urology resident on call.SIMPLE URODYNAMIC STUDY (SUDS) DISCHARGE INSTRUCTIONS    You have had a procedure that will require time to properly heal. Follow the instructions you have been given on how to care for yourself once you are home. Below is additional information to help in your recovery.    ACTIVITY  There are no restrictions in activity. Start doing again the things you did before the procedure.  You may experience a slight burning sensation. You may notice a small amount of blood in your urine. This will clear up within a day. Call the clinic if this continues beyond 48 hours.     DIET  Continue your normal diet. You may eat the same foods you ate before your procedure.  Drink plenty of fluids during the first 24-48 hours following your procedure.     MEDICATIONS  Resume all other previous medications from your prescribing physician.  Continue any pre-procedure antibiotics until they are all gone.     SIGNS AND SYMPTOMS TO REPORT TO THE DOCTOR  Chills or fever greater than 101° F within 24 hours of procedure.  Changes in urination, such as increased bleeding, foul smell, cloudy urine, or painful urination.  Call your doctor with any questions or concerns.     For any emergency situation, call 911 immediately or go to your nearest emergency room.     [facility]  [phone]

## 2024-03-27 ENCOUNTER — OFFICE VISIT (OUTPATIENT)
Dept: UROLOGY | Facility: CLINIC | Age: 74
End: 2024-03-27
Payer: COMMERCIAL

## 2024-03-27 VITALS
WEIGHT: 187.38 LBS | BODY MASS INDEX: 26.82 KG/M2 | HEART RATE: 59 BPM | SYSTOLIC BLOOD PRESSURE: 154 MMHG | HEIGHT: 70 IN | DIASTOLIC BLOOD PRESSURE: 74 MMHG

## 2024-03-27 DIAGNOSIS — R33.9 RETENTION OF URINE: Primary | ICD-10-CM

## 2024-03-27 PROCEDURE — 99999 PR PBB SHADOW E&M-EST. PATIENT-LVL III: CPT | Mod: PBBFAC,,, | Performed by: UROLOGY

## 2024-03-27 PROCEDURE — 87077 CULTURE AEROBIC IDENTIFY: CPT | Performed by: UROLOGY

## 2024-03-27 PROCEDURE — 3077F SYST BP >= 140 MM HG: CPT | Mod: CPTII,S$GLB,, | Performed by: UROLOGY

## 2024-03-27 PROCEDURE — 1159F MED LIST DOCD IN RCRD: CPT | Mod: CPTII,S$GLB,, | Performed by: UROLOGY

## 2024-03-27 PROCEDURE — 4010F ACE/ARB THERAPY RXD/TAKEN: CPT | Mod: CPTII,S$GLB,, | Performed by: UROLOGY

## 2024-03-27 PROCEDURE — 87186 SC STD MICRODIL/AGAR DIL: CPT | Performed by: UROLOGY

## 2024-03-27 PROCEDURE — 1101F PT FALLS ASSESS-DOCD LE1/YR: CPT | Mod: CPTII,S$GLB,, | Performed by: UROLOGY

## 2024-03-27 PROCEDURE — 3288F FALL RISK ASSESSMENT DOCD: CPT | Mod: CPTII,S$GLB,, | Performed by: UROLOGY

## 2024-03-27 PROCEDURE — 3008F BODY MASS INDEX DOCD: CPT | Mod: CPTII,S$GLB,, | Performed by: UROLOGY

## 2024-03-27 PROCEDURE — 87086 URINE CULTURE/COLONY COUNT: CPT | Performed by: UROLOGY

## 2024-03-27 PROCEDURE — 87088 URINE BACTERIA CULTURE: CPT | Performed by: UROLOGY

## 2024-03-27 PROCEDURE — 3078F DIAST BP <80 MM HG: CPT | Mod: CPTII,S$GLB,, | Performed by: UROLOGY

## 2024-03-27 PROCEDURE — 1157F ADVNC CARE PLAN IN RCRD: CPT | Mod: CPTII,S$GLB,, | Performed by: UROLOGY

## 2024-03-27 PROCEDURE — 99214 OFFICE O/P EST MOD 30 MIN: CPT | Mod: S$GLB,,, | Performed by: UROLOGY

## 2024-03-27 NOTE — PROGRESS NOTES
Subjective:       Patient ID: Rikki Mello is a 74 y.o. male.    Chief Complaint: No chief complaint on file.    HPI patient has trilobar hyperplasia and urinary retention.  He needs a urine culture has an indwelling Ritter and will do a Ritter catheter change.  I went over his urodynamics report with him and have explained that he may still not be able to urinate although I do believe that he will urinate after surgery    Past Medical History:   Diagnosis Date    BPH (benign prostatic hyperplasia)     General anesthetics causing adverse effect in therapeutic use     last awhile for orientation to fully comeback    Hypertension     Inguinal hernia unilateral, non-recurrent        Past Surgical History:   Procedure Laterality Date    COLONOSCOPY N/A 6/21/2018    Procedure: COLONOSCOPY;  Surgeon: Medhat Ponce MD;  Location: [facility] (4TH FLR);  Service: Endoscopy;  Laterality: N/A;  miralax prep (past preps miralax can not tolerate other preps)    COLONOSCOPY N/A 10/27/2023    Procedure: COLONOSCOPY;  Surgeon: Medhat Ponce MD;  Location: [facility] (4TH FLR);  Service: Endoscopy;  Laterality: N/A;  pt states currently not taking Eliquis-informed to call [phone] if restarts  Prep Specifications:Extended/Constipation prep  ok per Paula Damon to use to slot  9/14 ref Naina Narayanan NP, miralax extended, instr. handed to patient-  cardiology clearance fro    HERNIA REPAIR      TONSILLECTOMY      TRANSANAL RECTAL RESECTION  7/10/2018    Procedure: EXCISION, LESION, RECTUM, TRANSANAL APPROACH;  Surgeon: Medhat Ponce MD;  [facility] F[facility];  Service: Colon and Rectal;;    TUMOR EXCISION      rectum       No family history on file.    Social History     Socioeconomic History    Marital status:    Tobacco Use    Smoking status: Never    Smokeless tobacco: Never   Substance and Sexual Activity    Alcohol use: Yes     Comment: once a month    Drug use: No        Allergies:  Codeine    Medications:    Current Outpatient Medications:     amlodipine-benazepril (LOTREL) 10-40 mg per capsule, Take 1 capsule by mouth., Disp: , Rfl:     atorvastatin (LIPITOR) 20 MG tablet, Take 20 mg by mouth., Disp: , Rfl:     flecainide (TAMBOCOR) 50 MG Tab, Take 50 mg by mouth every 12 (twelve) hours., Disp: , Rfl:     sildenafil (REVATIO) 20 mg Tab, Take 100 mg by mouth., Disp: , Rfl:     tamsulosin (FLOMAX) 0.4 mg Cap, TAKE 1 CAPSULE (0.4 MG TOTAL) BY MOUTH 2 (TWO) TIMES DAILY.**[id number], Disp: , Rfl:     Current Facility-Administered Medications:     LIDOcaine HCl 2% urojet, , Rectal, 1 time in Clinic/HOD, Lamont Chung Jr., MD    Review of Systems    Objective:      Physical Exam  Constitutional:       Appearance: He is well-developed.   HENT:      Head: Normocephalic.   Cardiovascular:      Rate and Rhythm: Normal rate.   Pulmonary:      Effort: Pulmonary effort is normal.   Abdominal:      Palpations: Abdomen is soft.   Skin:     General: Skin is warm.   Neurological:      Mental Status: He is alert.         Assessment:       1. Retention of urine        Plan:       Diagnoses and all orders for this visit:    Retention of urine    Bipolar TURP under general anesthetic patient has been explained all the risks and benefits and has been informed that he may possibly need intermittent catheterization

## 2024-03-27 NOTE — H&P (VIEW-ONLY)
Subjective:       Patient ID: Rikki Mello is a 74 y.o. male.    Chief Complaint: No chief complaint on file.    HPI patient has trilobar hyperplasia and urinary retention.  He needs a urine culture has an indwelling Ritter and will do a Ritter catheter change.  I went over his urodynamics report with him and have explained that he may still not be able to urinate although I do believe that he will urinate after surgery    Past Medical History:   Diagnosis Date    BPH (benign prostatic hyperplasia)     General anesthetics causing adverse effect in therapeutic use     last awhile for orientation to fully comeback    Hypertension     Inguinal hernia unilateral, non-recurrent        Past Surgical History:   Procedure Laterality Date    COLONOSCOPY N/A 6/21/2018    Procedure: COLONOSCOPY;  Surgeon: Medhat Ponce MD;  Location: [facility] (4TH FLR);  Service: Endoscopy;  Laterality: N/A;  miralax prep (past preps miralax can not tolerate other preps)    COLONOSCOPY N/A 10/27/2023    Procedure: COLONOSCOPY;  Surgeon: Medhat Ponce MD;  Location: [facility] (4TH FLR);  Service: Endoscopy;  Laterality: N/A;  pt states currently not taking Eliquis-informed to call [phone] if restarts  Prep Specifications:Extended/Constipation prep  ok per Paula Damon to use to slot  9/14 ref Naina Narayanan NP, miralax extended, instr. handed to patient-  cardiology clearance fro    HERNIA REPAIR      TONSILLECTOMY      TRANSANAL RECTAL RESECTION  7/10/2018    Procedure: EXCISION, LESION, RECTUM, TRANSANAL APPROACH;  Surgeon: Medhat Ponce MD;  Location: [facility] OR [facility] FLR;  Service: Colon and Rectal;;    TUMOR EXCISION      rectum       No family history on file.    Social History     Socioeconomic History    Marital status:    Tobacco Use    Smoking status: Never    Smokeless tobacco: Never   Substance and Sexual Activity    Alcohol use: Yes     Comment: once a month    Drug use: No        Allergies:  Codeine    Medications:    Current Outpatient Medications:     amlodipine-benazepril (LOTREL) 10-40 mg per capsule, Take 1 capsule by mouth., Disp: , Rfl:     atorvastatin (LIPITOR) 20 MG tablet, Take 20 mg by mouth., Disp: , Rfl:     flecainide (TAMBOCOR) 50 MG Tab, Take 50 mg by mouth every 12 (twelve) hours., Disp: , Rfl:     sildenafil (REVATIO) 20 mg Tab, Take 100 mg by mouth., Disp: , Rfl:     tamsulosin (FLOMAX) 0.4 mg Cap, TAKE 1 CAPSULE (0.4 MG TOTAL) BY MOUTH 2 (TWO) TIMES DAILY.**[id number], Disp: , Rfl:     Current Facility-Administered Medications:     LIDOcaine HCl 2% urojet, , Rectal, 1 time in Clinic/HOD, Lamont Chung Jr., MD    Review of Systems    Objective:      Physical Exam  Constitutional:       Appearance: He is well-developed.   HENT:      Head: Normocephalic.   Cardiovascular:      Rate and Rhythm: Normal rate.   Pulmonary:      Effort: Pulmonary effort is normal.   Abdominal:      Palpations: Abdomen is soft.   Skin:     General: Skin is warm.   Neurological:      Mental Status: He is alert.         Assessment:       1. Retention of urine        Plan:       Diagnoses and all orders for this visit:    Retention of urine    Bipolar TURP under general anesthetic patient has been explained all the risks and benefits and has been informed that he may possibly need intermittent catheterization

## 2024-03-28 ENCOUNTER — TELEPHONE (OUTPATIENT)
Dept: UROLOGY | Facility: CLINIC | Age: 74
End: 2024-03-28
Payer: COMMERCIAL

## 2024-03-28 DIAGNOSIS — N40.0 BENIGN PROSTATIC HYPERPLASIA, UNSPECIFIED WHETHER LOWER URINARY TRACT SYMPTOMS PRESENT: Primary | ICD-10-CM

## 2024-03-28 NOTE — TELEPHONE ENCOUNTER
Called the pt to offer surgery date of 4/19, pt accepted. Informed the pt I will call the day before surgery with arrival time and pre-op instructions after 2pm. Pt verbalized understanding.

## 2024-04-01 LAB — BACTERIA UR CULT: ABNORMAL

## 2024-04-08 ENCOUNTER — TELEPHONE (OUTPATIENT)
Dept: UROLOGY | Facility: CLINIC | Age: 74
End: 2024-04-08
Payer: COMMERCIAL

## 2024-04-08 ENCOUNTER — TELEPHONE (OUTPATIENT)
Dept: PREADMISSION TESTING | Facility: HOSPITAL | Age: 74
End: 2024-04-08
Payer: COMMERCIAL

## 2024-04-08 NOTE — ANESTHESIA PAT ROS NOTE
04/08/2024  Rikki Mello is a 74 y.o., male.      Pre-op Assessment    I have reviewed the NPO Status.   I have reviewed the Medications.     Review of Systems  Anesthesia Hx:  No problems with previous Anesthesia             Denies Family Hx of Anesthesia complications.    Denies Personal Hx of Anesthesia complications.                    Social:  Non-Smoker, No Alcohol Use       Hematology/Oncology:  Hematology Normal   Oncology Normal                                   EENT/Dental:  chronic allergic rhinitis           Cardiovascular:  Exercise tolerance: good   Denies Pacemaker. Hypertension, well controlled    Dysrhythmias atrial fibrillation  Denies Angina.     hyperlipidemia     Functional Capacity good / => 4 METS                     Disorder of Cardiac Rhythm, Atrial Fibrillation, Paroxysmal Atrial Fibrillation (pt has been off Eliquis for several months), S/P electrical cardioversion, controlled on medical Rx   Other Cardiac Conditions SSS, on flecainide  Pulmonary:  Pulmonary Normal   Denies COPD.  Denies Asthma.   Denies Shortness of breath.   Denies Sleep Apnea.                Renal/:   renal calculi BPH              Hepatic/GI:  Hepatic/GI Normal     Denies GERD. Denies Liver Disease.            Neurological:  Neurology Normal Denies TIA.  Denies CVA.    Denies Seizures.                                Endocrine:  Endocrine Normal Denies Diabetes.           Dermatological:  Skin Normal    Psych:  Psychiatric Normal                         Anesthesia Assessment: Preoperative EQUATION    Planned Procedure: Procedure(s) (LRB):  BIPOLAR TURP (TRANSURETHRAL RESECTION OF PROSTATE) (N/A)  Requested Anesthesia Type:General  Surgeon: Lamont Chung Jr., MD  Service: Urology  Known or anticipated Date of Surgery:4/19/2024    Surgeon notes: reviewed    Electronic QUestionnaire Assessment completed via  nurse interview with patient.        Triage considerations:     The patient has no apparent active cardiac condition (No unstable coronary Syndrome such as severe unstable angina or recent [<1 month] myocardial infarction, decompensated CHF, severe valvular   disease or significant arrhythmia)    Previous anesthesia records:GETA and No problems  10/27/23 colonoscopy, gen anes, ASA 2    Last PCP note: within 3 months , outside [facility]   Subspecialty notes: Urology    Other important co-morbidities: A FIB, HLD, HTN, and BPH, kidney stones, ED, SSS (on flecainide after not tolerating sotalol), not currently taking anticoagulants       Tests already available:  No recent tests.             Instructions given. (See in Nurse's note)    Optimization:  Anesthesia Preop Clinic Assessment Indicated:     --phone screen done    Medical Opinion Indicated       Sub-specialist consult indicated:   TBD       Plan:    Testing:  BMP, EKG, and Hematology Profile     Consultation:Patient's PCP for a statement of optimization         Navigation: Tests Scheduled.              Consults scheduled.             Results will be tracked by Preop Clinic.

## 2024-04-08 NOTE — TELEPHONE ENCOUNTER
P/ dr reid, rx sent in for bactrim ds 1 po bid to start 7 days prior to sx. Instructions given to pt wife

## 2024-04-08 NOTE — TELEPHONE ENCOUNTER
----- Message from Chelsea Dahl sent at 4/8/2024 11:45 AM CDT -----  Who Called: Pt wife    What is the request in detail: Requesting call back to discuss pt having 04/19 procedure. Pt needs UTI meds. Please advise    [facility] #[id number] - [place], [place] - [address]  [address]  [place] [address]  Phone: [phone] Fax: [phone]      Can the clinic reply by [facility]? No    Best Call Back Number: [phone]      Additional Information:

## 2024-04-10 NOTE — PROCEDURES
Urodynamic Report     Indication:  luts         Summary:  Patient with lower urinary tract symptoms suds was compatible with elevated detrusor pressure and elevated postvoid residual of 239 cc  A 2 catheter CMG study was performed using subtract of rectal pressure monitoring rectal electrodes were placed in the usual manner and sterile water was placed at a rate of 30 cc/minute    The following are the results of the study:    1.  CMG         53 cm of water                                                                            Sensation:  116        First Desire:  128 cc        Strong Desire:  180 cc        Urgency:  209 mL         Capacity:         Abnormal Contractions:        Compliance:      2.  EMG:    EMG was performed continuously without evidence of bladder sphincter dyssynergia                 3.  Voiding phase                                                                                    Q max:         P det at Q max:  52 cm of water       Pattern of the curve:        Voided volume:       PVR:  200 cc     4.  Analysis:    Urodynamics study consistent with evidence of outlet obstruction cystoscopy demonstrated an elevated median lobe in his prostate measured 20 g    5.  Recommendations:  Bipolar TURP          CYSTOSCOPY REPORT      Pre Procedure Diagnosis:  See cystoscopy note on another procedure date     Post Procedure Diagnosis:      Anesthesia:        FINDINGS:         Specimen:        Procedure Summary:        Post procedure medication:           ADDITIONAL NOTES:           ASSESSMENT/PLAN:      TURP

## 2024-04-15 ENCOUNTER — HOSPITAL ENCOUNTER (OUTPATIENT)
Dept: PREADMISSION TESTING | Facility: HOSPITAL | Age: 74
Discharge: HOME | End: 2024-04-15
Attending: ANESTHESIOLOGY
Payer: COMMERCIAL

## 2024-04-15 DIAGNOSIS — Z01.818 PREOP TESTING: ICD-10-CM

## 2024-04-15 PROCEDURE — 93010 ELECTROCARDIOGRAM REPORT: CPT | Mod: ,,, | Performed by: GENERAL PRACTICE

## 2024-04-15 PROCEDURE — 93005 ELECTROCARDIOGRAM TRACING: CPT | Performed by: GENERAL PRACTICE

## 2024-04-18 ENCOUNTER — TELEPHONE (OUTPATIENT)
Dept: UROLOGY | Facility: CLINIC | Age: 74
End: 2024-04-18
Payer: COMMERCIAL

## 2024-04-18 ENCOUNTER — PATIENT MESSAGE (OUTPATIENT)
Dept: UROLOGY | Facility: CLINIC | Age: 74
End: 2024-04-18
Payer: COMMERCIAL

## 2024-04-19 ENCOUNTER — ANESTHESIA EVENT (OUTPATIENT)
Dept: SURGERY | Facility: HOSPITAL | Age: 74
End: 2024-04-19
Payer: COMMERCIAL

## 2024-04-19 ENCOUNTER — ANESTHESIA (OUTPATIENT)
Dept: SURGERY | Facility: HOSPITAL | Age: 74
End: 2024-04-19
Payer: COMMERCIAL

## 2024-04-19 ENCOUNTER — HOSPITAL ENCOUNTER (OUTPATIENT)
Facility: HOSPITAL | Age: 74
Discharge: HOME | End: 2024-04-20
Attending: UROLOGY | Admitting: UROLOGY
Payer: COMMERCIAL

## 2024-04-19 DIAGNOSIS — C61 PROSTATE CANCER: ICD-10-CM

## 2024-04-19 DIAGNOSIS — N40.0 BENIGN PROSTATIC HYPERPLASIA, UNSPECIFIED WHETHER LOWER URINARY TRACT SYMPTOMS PRESENT: Primary | ICD-10-CM

## 2024-04-19 DIAGNOSIS — Z01.818 PREOP TESTING: ICD-10-CM

## 2024-04-19 LAB
ABO + RH BLD: NORMAL
BLD GP AB SCN CELLS X3 SERPL QL: NORMAL

## 2024-04-19 PROCEDURE — 25000003 PHARM REV CODE 250: Performed by: NURSE ANESTHETIST, CERTIFIED REGISTERED

## 2024-04-19 PROCEDURE — 86901 BLOOD TYPING SEROLOGIC RH(D): CPT | Performed by: UROLOGY

## 2024-04-19 PROCEDURE — 99900035 HC TECH TIME PER 15 MIN (STAT)

## 2024-04-19 PROCEDURE — 25000003 PHARM REV CODE 250: Performed by: STUDENT IN AN ORGANIZED HEALTH CARE EDUCATION/TRAINING PROGRAM

## 2024-04-19 PROCEDURE — D9220A PRA ANESTHESIA: Mod: CRNA,,, | Performed by: NURSE ANESTHETIST, CERTIFIED REGISTERED

## 2024-04-19 PROCEDURE — 37000009 HC ANESTHESIA EA ADD 15 MINS: Performed by: UROLOGY

## 2024-04-19 PROCEDURE — 36000706: Performed by: UROLOGY

## 2024-04-19 PROCEDURE — 71000015 HC POSTOP RECOV 1ST HR: Performed by: UROLOGY

## 2024-04-19 PROCEDURE — 63600175 PHARM REV CODE 636 W HCPCS: Performed by: STUDENT IN AN ORGANIZED HEALTH CARE EDUCATION/TRAINING PROGRAM

## 2024-04-19 PROCEDURE — 27201423 OPTIME MED/SURG SUP & DEVICES STERILE SUPPLY: Performed by: UROLOGY

## 2024-04-19 PROCEDURE — 52601 PROSTATECTOMY (TURP): CPT | Mod: ,,, | Performed by: UROLOGY

## 2024-04-19 PROCEDURE — 88305 TISSUE EXAM BY PATHOLOGIST: CPT | Mod: 26,,, | Performed by: STUDENT IN AN ORGANIZED HEALTH CARE EDUCATION/TRAINING PROGRAM

## 2024-04-19 PROCEDURE — 88300 SURGICAL PATH GROSS: CPT | Performed by: STUDENT IN AN ORGANIZED HEALTH CARE EDUCATION/TRAINING PROGRAM

## 2024-04-19 PROCEDURE — 52235 CYSTOSCOPY AND TREATMENT: CPT | Mod: 51,,, | Performed by: UROLOGY

## 2024-04-19 PROCEDURE — 88300 SURGICAL PATH GROSS: CPT | Mod: 26,,, | Performed by: STUDENT IN AN ORGANIZED HEALTH CARE EDUCATION/TRAINING PROGRAM

## 2024-04-19 PROCEDURE — 63600175 PHARM REV CODE 636 W HCPCS: Performed by: NURSE ANESTHETIST, CERTIFIED REGISTERED

## 2024-04-19 PROCEDURE — 88305 TISSUE EXAM BY PATHOLOGIST: CPT | Performed by: STUDENT IN AN ORGANIZED HEALTH CARE EDUCATION/TRAINING PROGRAM

## 2024-04-19 PROCEDURE — D9220A PRA ANESTHESIA: Mod: ANES,,, | Performed by: SURGERY

## 2024-04-19 PROCEDURE — 82365 CALCULUS SPECTROSCOPY: CPT | Performed by: UROLOGY

## 2024-04-19 PROCEDURE — 37000008 HC ANESTHESIA 1ST 15 MINUTES: Performed by: UROLOGY

## 2024-04-19 PROCEDURE — 88307 TISSUE EXAM BY PATHOLOGIST: CPT | Performed by: STUDENT IN AN ORGANIZED HEALTH CARE EDUCATION/TRAINING PROGRAM

## 2024-04-19 PROCEDURE — 25000003 PHARM REV CODE 250

## 2024-04-19 PROCEDURE — 94761 N-INVAS EAR/PLS OXIMETRY MLT: CPT

## 2024-04-19 PROCEDURE — 86920 COMPATIBILITY TEST SPIN: CPT | Mod: 59 | Performed by: UROLOGY

## 2024-04-19 PROCEDURE — 36000707: Performed by: UROLOGY

## 2024-04-19 PROCEDURE — 71000033 HC RECOVERY, INTIAL HOUR: Performed by: UROLOGY

## 2024-04-19 RX ORDER — LIDOCAINE HYDROCHLORIDE 20 MG/ML
INJECTION, SOLUTION EPIDURAL; INFILTRATION; INTRACAUDAL; PERINEURAL
Status: DISCONTINUED | OUTPATIENT
Start: 2024-04-19 | End: 2024-04-19

## 2024-04-19 RX ORDER — PHENYLEPHRINE HCL IN 0.9% NACL 1 MG/10 ML
SYRINGE (ML) INTRAVENOUS
Status: DISCONTINUED | OUTPATIENT
Start: 2024-04-19 | End: 2024-04-19

## 2024-04-19 RX ORDER — ROCURONIUM BROMIDE 10 MG/ML
INJECTION, SOLUTION INTRAVENOUS
Status: DISCONTINUED | OUTPATIENT
Start: 2024-04-19 | End: 2024-04-19

## 2024-04-19 RX ORDER — SODIUM CHLORIDE 0.9 % (FLUSH) 0.9 %
3 SYRINGE (ML) INJECTION EVERY 6 HOURS PRN
Status: DISCONTINUED | OUTPATIENT
Start: 2024-04-19 | End: 2024-04-19 | Stop reason: HOSPADM

## 2024-04-19 RX ORDER — ONDANSETRON HYDROCHLORIDE 2 MG/ML
INJECTION, SOLUTION INTRAVENOUS
Status: DISCONTINUED | OUTPATIENT
Start: 2024-04-19 | End: 2024-04-19

## 2024-04-19 RX ORDER — OXYCODONE HYDROCHLORIDE 10 MG/1
10 TABLET ORAL EVERY 4 HOURS PRN
Status: DISCONTINUED | OUTPATIENT
Start: 2024-04-19 | End: 2024-04-20 | Stop reason: HOSPADM

## 2024-04-19 RX ORDER — SULFAMETHOXAZOLE AND TRIMETHOPRIM 400; 80 MG/1; MG/1
1 TABLET ORAL 2 TIMES DAILY
Status: DISCONTINUED | OUTPATIENT
Start: 2024-04-19 | End: 2024-04-20

## 2024-04-19 RX ORDER — AMLODIPINE AND BENAZEPRIL HYDROCHLORIDE 5; 20 MG/1; MG/1
1 CAPSULE ORAL NIGHTLY
Status: DISCONTINUED | OUTPATIENT
Start: 2024-04-19 | End: 2024-04-20 | Stop reason: HOSPADM

## 2024-04-19 RX ORDER — ACETAMINOPHEN 500 MG
1000 TABLET ORAL EVERY 6 HOURS PRN
Status: DISCONTINUED | OUTPATIENT
Start: 2024-04-19 | End: 2024-04-20 | Stop reason: HOSPADM

## 2024-04-19 RX ORDER — EPHEDRINE SULFATE 50 MG/ML
INJECTION, SOLUTION INTRAVENOUS
Status: DISCONTINUED | OUTPATIENT
Start: 2024-04-19 | End: 2024-04-19

## 2024-04-19 RX ORDER — SULFAMETHOXAZOLE AND TRIMETHOPRIM 400; 80 MG/1; MG/1
1 TABLET ORAL 2 TIMES DAILY
Status: DISCONTINUED | OUTPATIENT
Start: 2024-04-19 | End: 2024-04-19 | Stop reason: HOSPADM

## 2024-04-19 RX ORDER — VANCOMYCIN HYDROCHLORIDE 1 G/20ML
INJECTION, POWDER, LYOPHILIZED, FOR SOLUTION INTRAVENOUS
Status: DISCONTINUED
Start: 2024-04-19 | End: 2024-04-19 | Stop reason: HOSPADM

## 2024-04-19 RX ORDER — TAMSULOSIN HYDROCHLORIDE 0.4 MG/1
0.4 CAPSULE ORAL DAILY
Status: DISCONTINUED | OUTPATIENT
Start: 2024-04-19 | End: 2024-04-19 | Stop reason: HOSPADM

## 2024-04-19 RX ORDER — SODIUM CHLORIDE 0.9 % (FLUSH) 0.9 %
10 SYRINGE (ML) INJECTION
Status: DISCONTINUED | OUTPATIENT
Start: 2024-04-19 | End: 2024-04-20 | Stop reason: HOSPADM

## 2024-04-19 RX ORDER — FLECAINIDE ACETATE 50 MG/1
50 TABLET ORAL EVERY 12 HOURS
Status: DISCONTINUED | OUTPATIENT
Start: 2024-04-19 | End: 2024-04-19 | Stop reason: HOSPADM

## 2024-04-19 RX ORDER — ONDANSETRON HYDROCHLORIDE 2 MG/ML
4 INJECTION, SOLUTION INTRAVENOUS EVERY 6 HOURS PRN
Status: DISCONTINUED | OUTPATIENT
Start: 2024-04-19 | End: 2024-04-19 | Stop reason: HOSPADM

## 2024-04-19 RX ORDER — TALC
6 POWDER (GRAM) TOPICAL NIGHTLY PRN
Status: DISCONTINUED | OUTPATIENT
Start: 2024-04-19 | End: 2024-04-20 | Stop reason: HOSPADM

## 2024-04-19 RX ORDER — OXYBUTYNIN CHLORIDE 5 MG/5ML
5 SYRUP ORAL 3 TIMES DAILY PRN
Status: DISCONTINUED | OUTPATIENT
Start: 2024-04-19 | End: 2024-04-20 | Stop reason: HOSPADM

## 2024-04-19 RX ORDER — PHENAZOPYRIDINE HYDROCHLORIDE 100 MG/1
200 TABLET, FILM COATED ORAL 3 TIMES DAILY PRN
Status: DISCONTINUED | OUTPATIENT
Start: 2024-04-19 | End: 2024-04-19 | Stop reason: HOSPADM

## 2024-04-19 RX ORDER — ONDANSETRON HYDROCHLORIDE 2 MG/ML
4 INJECTION, SOLUTION INTRAVENOUS EVERY 6 HOURS PRN
Status: DISCONTINUED | OUTPATIENT
Start: 2024-04-19 | End: 2024-04-20 | Stop reason: HOSPADM

## 2024-04-19 RX ORDER — FLECAINIDE ACETATE 50 MG/1
50 TABLET ORAL EVERY 12 HOURS
Status: DISCONTINUED | OUTPATIENT
Start: 2024-04-19 | End: 2024-04-20 | Stop reason: HOSPADM

## 2024-04-19 RX ORDER — SULFAMETHOXAZOLE AND TRIMETHOPRIM 400; 80 MG/1; MG/1
1 TABLET ORAL 2 TIMES DAILY
COMMUNITY
End: 2024-05-07 | Stop reason: SDUPTHER

## 2024-04-19 RX ORDER — DEXAMETHASONE SODIUM PHOSPHATE 4 MG/ML
INJECTION, SOLUTION INTRA-ARTICULAR; INTRALESIONAL; INTRAMUSCULAR; INTRAVENOUS; SOFT TISSUE
Status: DISCONTINUED | OUTPATIENT
Start: 2024-04-19 | End: 2024-04-19

## 2024-04-19 RX ORDER — OXYCODONE HYDROCHLORIDE 5 MG/1
5 TABLET ORAL EVERY 4 HOURS PRN
Status: DISCONTINUED | OUTPATIENT
Start: 2024-04-19 | End: 2024-04-19 | Stop reason: HOSPADM

## 2024-04-19 RX ORDER — TAMSULOSIN HYDROCHLORIDE 0.4 MG/1
0.4 CAPSULE ORAL DAILY
Status: DISCONTINUED | OUTPATIENT
Start: 2024-04-20 | End: 2024-04-20 | Stop reason: HOSPADM

## 2024-04-19 RX ORDER — FENTANYL CITRATE 50 UG/ML
25 INJECTION, SOLUTION INTRAMUSCULAR; INTRAVENOUS EVERY 5 MIN PRN
Status: DISCONTINUED | OUTPATIENT
Start: 2024-04-19 | End: 2024-04-19 | Stop reason: HOSPADM

## 2024-04-19 RX ORDER — OXYBUTYNIN CHLORIDE 5 MG/1
5 TABLET ORAL 3 TIMES DAILY
Status: DISCONTINUED | OUTPATIENT
Start: 2024-04-19 | End: 2024-04-19 | Stop reason: HOSPADM

## 2024-04-19 RX ORDER — PROPOFOL 10 MG/ML
VIAL (ML) INTRAVENOUS
Status: DISCONTINUED | OUTPATIENT
Start: 2024-04-19 | End: 2024-04-19

## 2024-04-19 RX ORDER — AMLODIPINE AND BENAZEPRIL HYDROCHLORIDE 5; 20 MG/1; MG/1
1 CAPSULE ORAL NIGHTLY
Status: DISCONTINUED | OUTPATIENT
Start: 2024-04-19 | End: 2024-04-19 | Stop reason: HOSPADM

## 2024-04-19 RX ORDER — POLYETHYLENE GLYCOL 3350 17 G/17G
POWDER, FOR SOLUTION ORAL
COMMUNITY

## 2024-04-19 RX ORDER — FENTANYL CITRATE 50 UG/ML
INJECTION, SOLUTION INTRAMUSCULAR; INTRAVENOUS
Status: DISCONTINUED | OUTPATIENT
Start: 2024-04-19 | End: 2024-04-19

## 2024-04-19 RX ORDER — SODIUM CHLORIDE 0.9 % (FLUSH) 0.9 %
10 SYRINGE (ML) INJECTION
Status: DISCONTINUED | OUTPATIENT
Start: 2024-04-19 | End: 2024-04-19 | Stop reason: HOSPADM

## 2024-04-19 RX ADMIN — PROPOFOL 200 MG: 10 INJECTION, EMULSION INTRAVENOUS at 11:04

## 2024-04-19 RX ADMIN — SODIUM CHLORIDE: 0.9 INJECTION, SOLUTION INTRAVENOUS at 11:04

## 2024-04-19 RX ADMIN — ONDANSETRON 4 MG: 2 INJECTION INTRAMUSCULAR; INTRAVENOUS at 01:04

## 2024-04-19 RX ADMIN — FLECAINIDE ACETATE 50 MG: 50 TABLET ORAL at 09:04

## 2024-04-19 RX ADMIN — EPHEDRINE SULFATE 10 MG: 50 INJECTION INTRAVENOUS at 11:04

## 2024-04-19 RX ADMIN — SULFAMETHOXAZOLE AND TRIMETHOPRIM 1 TABLET: 400; 80 TABLET ORAL at 09:04

## 2024-04-19 RX ADMIN — ROCURONIUM BROMIDE 10 MG: 10 INJECTION, SOLUTION INTRAVENOUS at 12:04

## 2024-04-19 RX ADMIN — FENTANYL CITRATE 100 MCG: 50 INJECTION INTRAMUSCULAR; INTRAVENOUS at 11:04

## 2024-04-19 RX ADMIN — ROCURONIUM BROMIDE 50 MG: 10 INJECTION, SOLUTION INTRAVENOUS at 11:04

## 2024-04-19 RX ADMIN — GENTAMICIN SULFATE 240 MG: 40 INJECTION, SOLUTION INTRAMUSCULAR; INTRAVENOUS at 11:04

## 2024-04-19 RX ADMIN — SUGAMMADEX 200 MG: 100 INJECTION, SOLUTION INTRAVENOUS at 01:04

## 2024-04-19 RX ADMIN — LIDOCAINE HYDROCHLORIDE 100 MG: 20 INJECTION, SOLUTION EPIDURAL; INFILTRATION; INTRACAUDAL at 11:04

## 2024-04-19 RX ADMIN — TAMSULOSIN HYDROCHLORIDE 0.4 MG: 0.4 CAPSULE ORAL at 02:04

## 2024-04-19 RX ADMIN — OXYBUTYNIN CHLORIDE 5 MG: 5 TABLET ORAL at 02:04

## 2024-04-19 RX ADMIN — VANCOMYCIN HYDROCHLORIDE 1000 MG: 1 INJECTION, POWDER, LYOPHILIZED, FOR SOLUTION INTRAVENOUS at 11:04

## 2024-04-19 RX ADMIN — DEXAMETHASONE SODIUM PHOSPHATE 4 MG: 4 INJECTION INTRA-ARTICULAR; INTRALESIONAL; INTRAMUSCULAR; INTRAVENOUS; SOFT TISSUE at 11:04

## 2024-04-19 RX ADMIN — EPHEDRINE SULFATE 10 MG: 50 INJECTION INTRAVENOUS at 12:04

## 2024-04-19 RX ADMIN — Medication 200 MCG: at 11:04

## 2024-04-19 RX ADMIN — AMLODIPINE BESYLATE AND BENAZEPRIL HYDROCHLORIDE 1 CAPSULE: 5; 20 CAPSULE ORAL at 09:04

## 2024-04-19 NOTE — OP NOTE
[facility] ([facility])  [facility]  Operative Note    Date: 04/19/2024    Pre-Op Diagnosis:  BPH  Urinary retention    Post-Op Diagnosis:  BPH  Urinary retention  Bladder tumor  Bladder stone    Procedure(s) Performed:   1. TURBT of medium (2.0-5.0 cm) sized bladder tumor  2. TURP  3. Bladder stone extraction  4. Extraction of foreign body (Urolift clip)    Specimen(s):  Bladder Tumor  Prostate chips  Bladder stone  Foreign body extraction    Staff Surgeon: Lamont Chung MD    Assistant Surgeon: Kin Mera MD    Circulator: Nikole Cantu RN  Nurse: Eugenia Stewart RN     Anesthesia: General endotracheal anesthesia    Indications: Rikki Mello is a 74 y.o. male with a history of BPH s/p urolift c/b urinary retention and bladder stones. He presents today for TURP and all indicated procedures.     Operative Findings:   TURBT of medium sized tumor posterior bladder wall  Bladder stone extraction  TURP  Extraction of foreign body (Urolift clip)  24 Fr 3-way Ritter with 60 cc in balloon, on traction and CBI    Estimated Blood Loss: Minimal    Drains: 24 Fr Ritter catheter on CBI, 60 cc in the balloon    Procedure in detail:  After risks, benefits, and possible complications were explained, the patient elected to undergo the procedure and informed consent was obtained. All questions were answered in the ryan-operative area. The patient was transferred to the cystoscopy suite and placed in the supine position. SCDs were applied and working. Anesthesia was administered. The patient was placed in the dorsal lithotomy position and prepped and draped in the usual sterile fashion. Time out was performed, and ryan-procedural antibiotics were confirmed.     A 28-Serbian sheath resectoscope was placed into the bladder using a visual obturator. The visual obturator was exchanged for the resecting mechanism. It was then noted that there was a medium sized bladder tumor at the posterior wall of the  bladder. We then drained the bladder, removing the bladder stones. These were passed off the field for analysis. The decision was made to perform TURBT. The tumor was then resected superficially until the base was identified. Specimens were then removed and passed off the field for pathologic analysis. The bladder was drained and hemostasis was achieved. The resectoscope was removed from the patient.    We then proceeded with the TURP. The ureteral orifices were identified. The median lobe was first resected with care to avoid the ureteral orifices. Once the median lobe was resected, we then turned our attention to the right lateral lobe of the prostate.The right lateral lobe was then resected in a stepwise fashion from 7 o'clock to 12 o'clock with care to leave the verumontanum and sphincter intact. Once this was performed we directed our attention to the left lobe of the prostate and again the lateral lobe was resected from the 5 o'clock to the 12 o'clock position. Hemostasis was then achieved. During resection, the right lateral lobe urolift clip was removed and passed off the field for analysis.    The ureteral orifices, verumontanum and sphincter were intact. The [place] evacuator was used to remove all prostate chips and again hemostasis was obtained. We visually confirmed good hemostasis at the bladder tumor resection site.    Once the bladder was drained, we could see that he had an open channel and the scope was removed.  A 24 Fr 3-way catheter was placed in the bladder with 60 mL of water in the balloon. The patient was placed on traction and on CBI. The patient was transferred to recovery in stable condition.     Disposition: The patient will remain on the urology service overnight for observation and CBI will be titrated.    MD TIMBO Demarco was present entire procedure and agree with above  Gerardo LEE

## 2024-04-19 NOTE — NURSING
Nurses Note -- 4 Eyes      4/19/2024   3:18 PM      Skin assessed during: Admit      [x] No Altered Skin Integrity Present    [x]Prevention Measures Documented      [] Yes- Altered Skin Integrity Present or Discovered   [] LDA Added if Not in Epic (Describe Wound)   [] New Altered Skin Integrity was Present on Admit and Documented in LDA   [] Wound Image Taken    Wound Care Consulted? No    Attending Nurse:  Zoey Hernandez RN    Second RN/Staff Member:   Wandy Lynch RN

## 2024-04-19 NOTE — TRANSFER OF CARE
"Anesthesia Transfer of Care Note    Patient: Rikki Mello    Procedure(s) Performed: Procedure(s) (LRB):  BIPOLAR TURP (TRANSURETHRAL RESECTION OF PROSTATE) (N/A)  CYSTOSCOPY (N/A)  TURBT, USING BIPOLAR CAUTERY (N/A)  REMOVAL, FOREIGN BODY (N/A)  REMOVAL, CALCULUS, BLADDER (N/A)    Patient location: PACU    Anesthesia Type: general    Transport from OR: Transported from OR on 6-10 L/min O2 by face mask with adequate spontaneous ventilation    Post pain: adequate analgesia    Post assessment: no apparent anesthetic complications    Post vital signs: stable    Level of consciousness: awake    Nausea/Vomiting: no nausea/vomiting    Complications: none    Transfer of care protocol was followed      Last vitals: Visit Vitals  BP (!) 158/74 (BP Location: Left arm, Patient Position: Lying)   Pulse 69   Resp 20   Ht 5' 10" (1.778 m)   Wt 83.9 kg (185 lb)   SpO2 99%   BMI 26.54 kg/m²     "

## 2024-04-19 NOTE — NURSING
Patient arrived to room 553 via stretcher, family not present at bedside, wife called and voicemail left notifying her of pt arrival to room, VS taken and documented, admission documentation completed, SCD's applied, instructed on IS use, oriented to room and equipment, allowed time for questions, all questions answered, no further concerns voiced at this time, safety measures in place, call bell with in reach, instructed to call with any needs, verbalized understanding, continue current plan of care.

## 2024-04-19 NOTE — ANESTHESIA POSTPROCEDURE EVALUATION
Anesthesia Post Evaluation    Patient: Rikki Mello    Procedure(s) Performed: Procedure(s) (LRB):  BIPOLAR TURP (TRANSURETHRAL RESECTION OF PROSTATE) (N/A)  CYSTOSCOPY (N/A)  TURBT, USING BIPOLAR CAUTERY (N/A)  REMOVAL, FOREIGN BODY (N/A)  REMOVAL, CALCULUS, BLADDER (N/A)    Final Anesthesia Type: general      Patient location during evaluation: PACU  Patient participation: Yes- Able to Participate  Level of consciousness: awake and alert  Post-procedure vital signs: reviewed and stable  Pain management: adequate  Airway patency: patent  CARMEN mitigation strategies: Multimodal analgesia, Preoperative use of mandibular advancement devices or oral appliances and Intraoperative administration of CPAP, nasopharyngeal airway, or oral appliance during sedation  PONV status at discharge: No PONV  Anesthetic complications: no      Cardiovascular status: blood pressure returned to baseline, hemodynamically stable and stable  Respiratory status: unassisted and spontaneous ventilation  Hydration status: euvolemic  Follow-up not needed.              Vitals Value Taken Time   /64 04/19/24 1432   Temp 36.7 °C (98.1 °F) 04/19/24 1430   Pulse 75 04/19/24 1451   Resp 8 04/19/24 1435   SpO2 96 % 04/19/24 1451   Vitals shown include unfiled device data.      Event Time   Out of Recovery 14:05:00         Pain/Tawny Score: Tawny Score: 10 (4/19/2024  2:05 PM)

## 2024-04-19 NOTE — NURSING TRANSFER
Nursing Transfer Note      4/19/2024   2:40 PM    Nurse giving handoff:Holly ACE RN  Nurse receiving handoff:Zoey ACEVES    Reason patient is being transferred: meets criteria    Transfer To: 553    Transfer via stretcher    Transfer with none    Transported by transport    Transfer Vital Signs:  Blood Pressure:131/64  Heart Rate:77  O2:100  Temperature:98.1  Respirations:20    Telemetry: no  Order for Tele Monitor? No    Additional Lines: Ritter Catheter    4eyes on Skin: yes    Medicines sent: none    Any special needs or follow-up needed: CBI    Patient belongings transferred with patient: Yes    Chart send with patient: Yes    Notified: spouse    Patient reassessed at: 4/19

## 2024-04-19 NOTE — PLAN OF CARE
Pt IV flushed. Pt does not c/o pain or discomfort at this time. Ritter in place and draining, CBI in place, urine pink tinged.

## 2024-04-19 NOTE — INTERVAL H&P NOTE
The patient has been examined and the H&P has been reviewed:    I concur with the findings and no changes have occurred since H&P was written.    Surgery risks, benefits and alternative options discussed and understood by patient/family.    Patient has been taking antibiotics as scheduled. Denies anticoagulation.    Consented for TURP in preop, no further questions or concerns.      Active Hospital Problems    Diagnosis  POA    *BPH (benign prostatic hyperplasia) [N40.0]  Yes      Resolved Hospital Problems   No resolved problems to display.

## 2024-04-19 NOTE — BRIEF OP NOTE
[facility] ([facility])  Brief Operative Note    SUMMARY     Surgery Date: 4/19/2024     Surgeons and Role:     * Lamont Chung Jr., MD - Primary     * Kin Mera MD - Resident - Assisting        Pre-op Diagnosis:  Benign prostatic hyperplasia, unspecified whether lower urinary tract symptoms present [N40.0]    Post-op Diagnosis:  Post-Op Diagnosis Codes:     * Benign prostatic hyperplasia, unspecified whether lower urinary tract symptoms present [N40.0]    Procedure(s) (LRB):  BIPOLAR TURP (TRANSURETHRAL RESECTION OF PROSTATE) (N/A)  CYSTOSCOPY (N/A)  TURBT, USING BIPOLAR CAUTERY (N/A)    Anesthesia: General    Implants:  * No implants in log *    Operative Findings:   TURBT of medium sized tumor posterior bladder wall  Bladder stone extraction  TURP  Extraction of foreign body (Urolift clip)  24 Fr 3-way Ritter with 60 cc in balloon, on traction and CBI    Estimated Blood Loss: * No values recorded between 4/19/2024 11:39 AM and 4/19/2024  1:25 PM *    Estimated Blood Loss has not been documented. EBL = Minimal.         Specimens:   Specimen (24h ago, onward)       Start     Ordered    Pending  Specimen to Pathology, Surgery Urology  Once        Comments: Pre-op Diagnosis: Benign prostatic hyperplasia, unspecified whether lower urinary tract symptoms present [N40.0]Procedure(s):BIPOLAR TURP (TRANSURETHRAL RESECTION OF PROSTATE) Number of specimens: 3Name of specimens: 1. Posterior bladder tumor mixed with bladder stone2. Prostate chips3. Gross for ID only likely urolift clip     References:    Click here for ordering Quick Tip   Question Answer Comment   Procedure Type: Urology    Which provider would you like to cc? KIN MERA    Release to patient Immediate        Pending                    [phone]

## 2024-04-19 NOTE — ANESTHESIA PREPROCEDURE EVALUATION
[facility]  Anesthesia Pre-Operative Evaluation         Patient Name: Rikki Mello  YOB: 1950  MRN: [id number]    SUBJECTIVE:     BIPOLAR TURP (TRANSURETHRAL RESECTION OF PROSTATE) (N/A)       04/19/2024    Rikki Mello is a 74 y.o. male w/ a significant PMHx of HTN, Afib on flecainide (Pt not on anticoagulation), BPH with indwelling joseph catheter, Kidney stones.    Patient now presents for the above procedure(s).      LDA: None documented.        Prev airway: None documented.    Drips: None documented.         Patient Active Problem List   Diagnosis    Screening for colon cancer    Rectal polyp       Review of patient's allergies indicates:   Allergen Reactions    Codeine Itching       Current Outpatient Medications:  No current facility-administered medications for this encounter.    Past Surgical History:   Procedure Laterality Date    COLONOSCOPY N/A 6/21/2018    Procedure: COLONOSCOPY;  Surgeon: Medhat Ponce MD;  [facility]  Service: Endoscopy;  Laterality: N/A;  miralax prep (past preps miralax can not tolerate other preps)    COLONOSCOPY N/A 10/27/2023    Procedure: COLONOSCOPY;  Surgeon: Medhat Ponce MD;  [facility]  Service: Endoscopy;  Laterality: N/A;  pt states currently not taking Eliquis-informed to call [phone] if restarts  Prep Specifications:Extended/Constipation prep  ok per Paula Damon to use to slot  9/14 ref Naina Narayanan NP, miralax extended, instr. handed to patient-  cardiology clearance fro    HERNIA REPAIR      TONSILLECTOMY      TRANSANAL RECTAL RESECTION  7/10/2018    Procedure: EXCISION, LESION, RECTUM, TRANSANAL APPROACH;  Surgeon: Medhat Ponce MD;  [facility];  Service: Colon and Rectal;;    TUMOR EXCISION      rectum       Social History     Socioeconomic  History    Marital status:    Tobacco Use    Smoking status: Never    Smokeless tobacco: Never   Substance and Sexual Activity    Alcohol use: Yes     Comment: once a month    Drug use: No       OBJECTIVE:     Vital Signs Range (Last 24H):         Significant Labs:  Lab Results   Component Value Date    WBC 5.84 04/15/2024    HGB 15.6 04/15/2024    HCT 44.8 04/15/2024     04/15/2024     04/15/2024    K 4.6 04/15/2024     04/15/2024    CREATININE 1.4 04/15/2024    BUN 19 04/15/2024    CO2 26 04/15/2024    INR 0.9 07/09/2007       Diagnostic Studies: No relevant studies.    2D ECHO:  TTE: 1/29/2018  Impressions                                                             -----------                                                                                                                                      1.No hemodynamically significant valvular disease                                                                                                2.The estimated LV ejection fraction is 55 %                                                                                                      3.There is mild aortic valve sclerosis without significant stenosis                                                                              There are no prior studies for comparison.                                                                                                            LUCIA:  No results found for this or any previous visit.    ASSESSMENT/PLAN:         Pre-op Assessment    I have reviewed the Patient Summary Reports.     I have reviewed the Nursing Notes. I have reviewed the NPO Status.      Review of Systems  Anesthesia Hx:  No problems with previous Anesthesia             Denies Family Hx of Anesthesia complications.    Denies Personal Hx of Anesthesia complications.                    Hematology/Oncology:  Hematology Normal   Oncology Normal                                    EENT/Dental:  EENT/Dental Normal           Cardiovascular:     Hypertension    Dysrhythmias atrial fibrillation                                   Pulmonary:  Pulmonary Normal                       Renal/:   renal calculi BPH              Hepatic/GI:  Bowel Prep.                Musculoskeletal:  Musculoskeletal Normal                Neurological:  Neurology Normal                                      Endocrine:  Endocrine Normal            Dermatological:  Skin Normal    Psych:  Psychiatric Normal                    Physical Exam  General: Well nourished, Cooperative, Alert and Oriented    Airway:  Mallampati: II / II  Mouth Opening: Normal  TM Distance: Normal  Tongue: Normal    Dental:  Intact    Chest/Lungs:  Clear to auscultation, Normal Respiratory Rate    Heart:  Rate: Normal  Rhythm: Regular Rhythm        Anesthesia Plan  Type of Anesthesia, risks & benefits discussed:    Anesthesia Type: Gen ETT  Intra-op Monitoring Plan: Standard ASA Monitors  Post Op Pain Control Plan: multimodal analgesia and IV/PO Opioids PRN  Induction:  IV  Informed Consent: Informed consent signed with the Patient and all parties understand the risks and agree with anesthesia plan.  All questions answered.   ASA Score: 2  Day of Surgery Review of History & Physical: H&P Update referred to the surgeon/provider.    Ready For Surgery From Anesthesia Perspective.     .

## 2024-04-19 NOTE — PROGRESS NOTES
Dr. Lopez bedside and stated he does not need a urine in pre op for procedure. Patient has joseph in place.    wctm

## 2024-04-19 NOTE — ANESTHESIA PROCEDURE NOTES
Intubation    Date/Time: 4/19/2024 11:31 AM    Performed by: Annamaria Palomino CRNA  Authorized by: Oumar Raza MD    Intubation:     Induction:  Intravenous    Intubated:  Postinduction    Mask Ventilation:  Easy mask    Attempts:  1    Attempted By:  CRNA    Method of Intubation:  Video laryngoscopy    Blade:  Licona 3    Laryngeal View Grade: Grade I - full view of cords      Difficult Airway Encountered?: No      Complications:  None    Airway Device:  Oral endotracheal tube    Airway Device Size:  7.5    Style/Cuff Inflation:  Cuffed (inflated to minimal occlusive pressure)    Inflation Amount (mL):  6    Tube secured:  23    Secured at:  The lips    Placement Verified By:  Capnometry    Complicating Factors:  None    Findings Post-Intubation:  BS equal bilateral and atraumatic/condition of teeth unchanged

## 2024-04-20 VITALS
OXYGEN SATURATION: 99 % | WEIGHT: 192 LBS | HEART RATE: 54 BPM | BODY MASS INDEX: 27.49 KG/M2 | RESPIRATION RATE: 20 BRPM | HEIGHT: 70 IN | DIASTOLIC BLOOD PRESSURE: 58 MMHG | TEMPERATURE: 98 F | SYSTOLIC BLOOD PRESSURE: 120 MMHG

## 2024-04-20 PROCEDURE — 25000003 PHARM REV CODE 250

## 2024-04-20 RX ORDER — SULFAMETHOXAZOLE AND TRIMETHOPRIM 400; 80 MG/1; MG/1
1 TABLET ORAL DAILY
Qty: 4 TABLET | Refills: 0 | Status: SHIPPED | OUTPATIENT
Start: 2024-04-20 | End: 2024-04-24

## 2024-04-20 RX ORDER — SULFAMETHOXAZOLE AND TRIMETHOPRIM 800; 160 MG/1; MG/1
1 TABLET ORAL 2 TIMES DAILY
Status: DISCONTINUED | OUTPATIENT
Start: 2024-04-20 | End: 2024-04-20 | Stop reason: HOSPADM

## 2024-04-20 RX ORDER — SULFAMETHOXAZOLE AND TRIMETHOPRIM 800; 160 MG/1; MG/1
1 TABLET ORAL 2 TIMES DAILY
Status: DISCONTINUED | OUTPATIENT
Start: 2024-04-20 | End: 2024-04-20

## 2024-04-20 RX ADMIN — SULFAMETHOXAZOLE AND TRIMETHOPRIM 1 TABLET: 800; 160 TABLET ORAL at 09:04

## 2024-04-20 RX ADMIN — TAMSULOSIN HYDROCHLORIDE 0.4 MG: 0.4 CAPSULE ORAL at 09:04

## 2024-04-20 RX ADMIN — FLECAINIDE ACETATE 50 MG: 50 TABLET ORAL at 09:04

## 2024-04-20 NOTE — PROGRESS NOTES
[facility]  Urology  Progress Note    Patient Name: Rikki Mello  MRN: [id number]  Admission Date: 4/19/2024  Hospital Length of Stay: 0 days  Code Status: Full Code   Attending Provider: Lamont Chung Jr., MD   Primary Care Physician: Jacqueline, Primary Doctor    Subjective:     HPI:  No notes on file    Interval History: NAOE, doing well this AM no complaints, CBI clamped this AM urine clear inflow plugged 30cc removed from balloon off traction. Has ambulated, tolerating diet, will discharge today      Objective:     Temp:  [97.8 °F (36.6 °C)-98.3 °F (36.8 °C)] 98.1 °F (36.7 °C)  Pulse:  [54-87] 54  Resp:  [16-24] 20  SpO2:  [95 %-100 %] 99 %  BP: (101-158)/(54-74) 120/58     Body mass index is 27.55 kg/m².    Date 04/20/24 0700 - 04/21/24 0659   Shift 9655-6404 0198-8798 8268-6155 24 Hour Total   INTAKE   Shift Total(mL/kg)       OUTPUT   Urine(mL/kg/hr) 750   750   Shift Total(mL/kg) 750(8.6)   750(8.6)   Weight (kg) 87.1 87.1 87.1 87.1          Drains       Drain  Duration                  Urethral Catheter 04/19/24 Straight-tip;Triple-lumen;Latex 24 Fr. 1 day                     Physical Exam  Constitutional:       General: He is not in acute distress.  HENT:      Head: Normocephalic and atraumatic.      Nose: Nose normal.   Eyes:      Conjunctiva/sclera: Conjunctivae normal.   Cardiovascular:      Rate and Rhythm: Normal rate.   Pulmonary:      Effort: Pulmonary effort is normal. No respiratory distress.   Genitourinary:     Comments: 3 way joseph in place w/30cc in balloon, urine in tube clear  Musculoskeletal:         General: No deformity.   Skin:     General: Skin is warm and dry.   Neurological:      General: No focal deficit present.      Mental Status: He is alert.   Psychiatric:         Mood and Affect: Mood normal.         Behavior: Behavior normal.           Significant Labs:    BMP:  Recent Labs   Lab 04/15/24  1509      K 4.6      CO2 26   BUN 19   CREATININE 1.4   CALCIUM 9.5        CBC:   Recent Labs   Lab 04/15/24  1509   WBC 5.84   HGB 15.6   HCT 44.8          All pertinent labs results from the past 24 hours have been reviewed.    Significant Imaging:  All pertinent imaging results/findings from the past 24 hours have been reviewed.                  Assessment/Plan:     * BPH (benign prostatic hyperplasia)  Rikki Mello is an 74 y.o. male that is s/p TURP and TURBT on 4/19. Doing well post-op.    - Pain - mmpc  - Diet - reg  - Nausea control w/ PRN antiemetics   - OOB  - Encourage IS   - CBI clamped, joseph with clear urine, will schedule for outpatient follow up with Dr. Chung for VT on Wednesday  - follow up TURBT pathology in clinic with Dr. Chung    - Dispo: discharge today           VTE Risk Mitigation (From admission, onward)           Ordered     Place NOAH hose  Until discontinued         04/19/24 2013     Place sequential compression device  Until discontinued         04/19/24 2013                    Duong Herrera MD  Urology  [facility] - Surgery

## 2024-04-20 NOTE — PLAN OF CARE
[facility]  Discharge Final Note    Primary Care Provider: No, Primary Doctor    Expected Discharge Date: 4/20/2024    Final Discharge Note (most recent)       Final Note - 04/20/24 1104          Final Note    Assessment Type Final Discharge Note (P)      Anticipated Discharge Disposition Home or Self Care (P)      What phone number can be called within the next 1-3 days to see how you are doing after discharge? [phone] (P)      Hospital Resources/Appts/Education Provided Provided patient/caregiver with written discharge plan information;Provided education on problems/symptoms using teachback;Appointments scheduled and added to AVS (P)         Post-Acute Status    Post-Acute Authorization Other (P)      Other Status No Post-Acute Service Needs (P)      Discharge Delays None known at this time (P)                      Important Message from Medicare             SW discharging home with Self-Care. After review of pt's medical record, no discharge needs identified at this time.     Jovanny Holland LMSW

## 2024-04-20 NOTE — SUBJECTIVE & OBJECTIVE
Interval History: NAOE, doing well this AM no complaints, CBI clamped this AM urine clear inflow plugged 30cc removed from balloon off traction. Has ambulated, tolerating diet, will discharge today      Objective:     Temp:  [97.8 °F (36.6 °C)-98.3 °F (36.8 °C)] 98.1 °F (36.7 °C)  Pulse:  [54-87] 54  Resp:  [16-24] 20  SpO2:  [95 %-100 %] 99 %  BP: (101-158)/(54-74) 120/58     Body mass index is 27.55 kg/m².    Date 04/20/24 0700 - 04/21/24 0659   Shift 3566-5057 6589-2063 6528-7349 24 Hour Total   INTAKE   Shift Total(mL/kg)       OUTPUT   Urine(mL/kg/hr) 750   750   Shift Total(mL/kg) 750(8.6)   750(8.6)   Weight (kg) 87.1 87.1 87.1 87.1          Drains       Drain  Duration                  Urethral Catheter 04/19/24 Straight-tip;Triple-lumen;Latex 24 Fr. 1 day                     Physical Exam  Constitutional:       General: He is not in acute distress.  HENT:      Head: Normocephalic and atraumatic.      Nose: Nose normal.   Eyes:      Conjunctiva/sclera: Conjunctivae normal.   Cardiovascular:      Rate and Rhythm: Normal rate.   Pulmonary:      Effort: Pulmonary effort is normal. No respiratory distress.   Genitourinary:     Comments: 3 way joseph in place w/30cc in balloon, urine in tube clear  Musculoskeletal:         General: No deformity.   Skin:     General: Skin is warm and dry.   Neurological:      General: No focal deficit present.      Mental Status: He is alert.   Psychiatric:         Mood and Affect: Mood normal.         Behavior: Behavior normal.           Significant Labs:    BMP:  Recent Labs   Lab 04/15/24  1509      K 4.6      CO2 26   BUN 19   CREATININE 1.4   CALCIUM 9.5       CBC:   Recent Labs   Lab 04/15/24  1509   WBC 5.84   HGB 15.6   HCT 44.8          All pertinent labs results from the past 24 hours have been reviewed.    Significant Imaging:  All pertinent imaging results/findings from the past 24 hours have been reviewed.

## 2024-04-20 NOTE — NURSING
Nurses Note -- 4 Eyes      4/19/2024   7:35 PM      Skin assessed during: Daily Assessment      [x] No Altered Skin Integrity Present    []Prevention Measures Documented      [] Yes- Altered Skin Integrity Present or Discovered   [] LDA Added if Not in Epic (Describe Wound)   [] New Altered Skin Integrity was Present on Admit and Documented in LDA   [] Wound Image Taken    Wound Care Consulted? No    Attending Nurse:  Michael Knox RN      Second RN/Staff Member:  Layne Dorantes CNA

## 2024-04-20 NOTE — DISCHARGE SUMMARY
[facility] - [facility]  Urology  Discharge Summary      Patient Name: Rikki Mello  MRN: [id number]  Admission Date: 4/19/2024  Hospital Length of Stay: 0 days  Discharge Date and Time: No discharge date for patient encounter.  Attending Physician: Lamont Chung Jr., MD   Discharging Provider: Duong Herrera MD  Primary Care Physician: Jacqueline, Primary Doctor    HPI:   74yoM s/p TURP on 4/19 who an incidental bladder mass was noted and hence TURBT also performed.    Procedure(s) (LRB):  BIPOLAR TURP (TRANSURETHRAL RESECTION OF PROSTATE) (N/A)  CYSTOSCOPY (N/A)  TURBT, USING BIPOLAR CAUTERY (N/A)  REMOVAL, FOREIGN BODY (N/A)  REMOVAL, CALCULUS, BLADDER (N/A)     Indwelling Lines/Drains at time of discharge:   Lines/Drains/Airways       Drain  Duration                  Urethral Catheter 04/19/24 Straight-tip;Triple-lumen;Latex 24 Fr. 1 day                    Hospital Course The patient was admitted for the above procedure. He tolerated the procedure well in its entirety without issue. For more details, please refer to the complete operative note. The patient was started on CBI, and titrated to light pink urine. He was started on regular diet and tolerated it well. Pain was controlled with PO analgesics, and he was ambulating without issues. On POD1, the CBI was clamped, and the urine remained acceptable character. The catheter will remain on discharge. He was deemed suitable for discharge.     Patient also had bladder mass noticed during cystoscopy which was removed during TURP.     Goals of Care Treatment Preferences:  Code Status: Full Code    Living Will: Yes              Consults: none    Significant Diagnostic Studies:     Pending Diagnostic Studies:       Procedure Component Value Units Date/Time    Specimen to Pathology, Surgery Urology [[id number]] Collected: 04/19/24 1331    Order Status: Sent Lab Status: In process Updated: 04/20/24 0006    Specimen: Tissue             Final Active Diagnoses:    Diagnosis Date  Noted POA    PRINCIPAL PROBLEM:  BPH (benign prostatic hyperplasia) [N40.0] 04/19/2024 Yes      Problems Resolved During this Admission:         Discharged Condition: good    Disposition: Home or Self Care    Follow Up: in clinic on Wednesday with Dr. Chung    Patient Instructions:      Basic Metabolic Panel   Standing Status: Future Number of Occurrences: 1 Standing Exp. Date: 06/07/25     CBC Without Differential   Standing Status: Future Number of Occurrences: 1 Standing Exp. Date: 06/07/25     EKG 12-lead   Standing Status: Future Number of Occurrences: 1 Standing Exp. Date: 04/08/25     Medications:  Reconciled Home Medications:      Medication List        CONTINUE taking these medications      amLODIPine-benazepriL 10-40 mg per capsule  Commonly known as: LOTREL  Take 1 capsule by mouth.     atorvastatin 20 MG tablet  Commonly known as: LIPITOR  Take 20 mg by mouth.     flecainide 50 MG Tab  Commonly known as: TAMBOCOR  Take 50 mg by mouth every 12 (twelve) hours.     polyethylene glycol 17 gram Pwpk  Commonly known as: GLYCOLAX  Take by mouth.     sildenafil 20 mg Tab  Commonly known as: REVATIO  Take 100 mg by mouth.     sulfamethoxazole-trimethoprim 400-80mg 400-80 mg per tablet  Commonly known as: BACTRIM,SEPTRA  Take 1 tablet by mouth 2 (two) times daily.     tamsulosin 0.4 mg Cap  Commonly known as: FLOMAX  TAKE 1 CAPSULE (0.4 MG TOTAL) BY MOUTH 2 (TWO) TIMES DAILY.**[id number]              Time spent on the discharge of patient: 20 minutes    Duong Herrera MD  Urology  [facility]

## 2024-04-20 NOTE — PLAN OF CARE
Pt AAOx4 and VSS . Progressing with plan of care. Free of skin breakdown as the pt positioned/repositioned well independently. CBI until 4 am per MD order. Ritter in place . Incentive spirometer at bedside and pt instructed on its use. No complain of pain at this time. Frequent rounds made to assess pain and safety and no complaints at this time noted. Side rails up x 2. Bed locked. Call light within reach. No falls noted. Will continue to monitor.     Problem: Adult Inpatient Plan of Care  Goal: Plan of Care Review  Outcome: Ongoing, Progressing  Goal: Patient-Specific Goal (Individualized)  Outcome: Ongoing, Progressing  Goal: Absence of Hospital-Acquired Illness or Injury  Outcome: Ongoing, Progressing  Goal: Optimal Comfort and Wellbeing  Outcome: Ongoing, Progressing  Goal: Readiness for Transition of Care  Outcome: Ongoing, Progressing     Problem: Infection  Goal: Absence of Infection Signs and Symptoms  Outcome: Ongoing, Progressing

## 2024-04-20 NOTE — ASSESSMENT & PLAN NOTE
Rikki Mello is an 74 y.o. male that is s/p TURP on 4/19. Doing well post-op.    - Pain - mmpc  - Diet - reg  - Nausea control w/ PRN antiemetics   - OOB  - Encourage IS   - CBI clamped, joseph with clear urine, will schedule for outpatient follow up with Dr. Chung for VT on Wednesday    - Dispo: discharge today

## 2024-04-20 NOTE — PLAN OF CARE
Problem: Adult Inpatient Plan of Care  Goal: Plan of Care Review  Outcome: Met  Goal: Patient-Specific Goal (Individualized)  Outcome: Met  Goal: Absence of Hospital-Acquired Illness or Injury  Outcome: Met  Goal: Optimal Comfort and Wellbeing  Outcome: Met  Goal: Readiness for Transition of Care  Outcome: Met     Problem: Infection  Goal: Absence of Infection Signs and Symptoms  Outcome: Met     Patient discharging home with wife, discharge instructions reviewed in detail with patient, allowed time for questions, all questions answered, IV removed, gauze and tape applied, bleeding controlled, discharge medications have been delivered to bedside, pt left per wheelchair with hospital staff, NAD noted.

## 2024-04-20 NOTE — DISCHARGE INSTRUCTIONS
Post Cystoscopy and Transurethral resection of Prostate  Do not strain to have a bowel movement  No strenuous exercise x 7 days  No driving while you are on narcotic pain medications or if your joseph  catheter is in place    You can expect:  To see blood in your urine.    Go to the ER if:  You are having severe abdominal pain  Inability to urinate if you do not have a catheter  Catheter stops draining if you have one in place.    Call the doctor if:  Temperature is greater than 101F  Persistent vomiting and inability to keep food down

## 2024-04-22 ENCOUNTER — TELEPHONE (OUTPATIENT)
Dept: UROLOGY | Facility: CLINIC | Age: 74
End: 2024-04-22
Payer: COMMERCIAL

## 2024-04-22 NOTE — TELEPHONE ENCOUNTER
----- Message from Duong Herrera MD sent at 4/20/2024  8:36 AM CDT -----  Regarding: follow up  Hi,    Could you please schedule this patient for follow on Wednesday with Dr. Chung for joseph removal and VT. Thank you so much!    Thanks,  Souleymane

## 2024-04-23 LAB
BLD PROD TYP BPU: NORMAL
BLD PROD TYP BPU: NORMAL
BLOOD UNIT EXPIRATION DATE: NORMAL
BLOOD UNIT EXPIRATION DATE: NORMAL
BLOOD UNIT TYPE CODE: 5100
BLOOD UNIT TYPE CODE: 5100
BLOOD UNIT TYPE: NORMAL
BLOOD UNIT TYPE: NORMAL
CODING SYSTEM: NORMAL
CODING SYSTEM: NORMAL
CROSSMATCH INTERPRETATION: NORMAL
CROSSMATCH INTERPRETATION: NORMAL
DISPENSE STATUS: NORMAL
DISPENSE STATUS: NORMAL
NUM UNITS TRANS PACKED RBC: NORMAL
NUM UNITS TRANS PACKED RBC: NORMAL

## 2024-04-24 ENCOUNTER — OFFICE VISIT (OUTPATIENT)
Dept: UROLOGY | Facility: CLINIC | Age: 74
End: 2024-04-24
Payer: COMMERCIAL

## 2024-04-24 ENCOUNTER — TELEPHONE (OUTPATIENT)
Dept: UROLOGY | Facility: CLINIC | Age: 74
End: 2024-04-24

## 2024-04-24 VITALS
HEART RATE: 66 BPM | WEIGHT: 190.94 LBS | BODY MASS INDEX: 27.34 KG/M2 | DIASTOLIC BLOOD PRESSURE: 58 MMHG | HEIGHT: 70 IN | SYSTOLIC BLOOD PRESSURE: 143 MMHG

## 2024-04-24 DIAGNOSIS — N40.0 BENIGN PROSTATIC HYPERPLASIA, UNSPECIFIED WHETHER LOWER URINARY TRACT SYMPTOMS PRESENT: Primary | ICD-10-CM

## 2024-04-24 DIAGNOSIS — N40.1 BPH WITH URINARY OBSTRUCTION: ICD-10-CM

## 2024-04-24 DIAGNOSIS — N13.8 BPH WITH URINARY OBSTRUCTION: ICD-10-CM

## 2024-04-24 DIAGNOSIS — N30.00 ACUTE CYSTITIS WITHOUT HEMATURIA: ICD-10-CM

## 2024-04-24 DIAGNOSIS — R33.9 RETENTION OF URINE: ICD-10-CM

## 2024-04-24 PROCEDURE — 1126F AMNT PAIN NOTED NONE PRSNT: CPT | Mod: CPTII,S$GLB,,

## 2024-04-24 PROCEDURE — 1101F PT FALLS ASSESS-DOCD LE1/YR: CPT | Mod: CPTII,S$GLB,,

## 2024-04-24 PROCEDURE — 4010F ACE/ARB THERAPY RXD/TAKEN: CPT | Mod: CPTII,S$GLB,,

## 2024-04-24 PROCEDURE — 99024 POSTOP FOLLOW-UP VISIT: CPT | Mod: S$GLB,,,

## 2024-04-24 PROCEDURE — 3078F DIAST BP <80 MM HG: CPT | Mod: CPTII,S$GLB,,

## 2024-04-24 PROCEDURE — 1157F ADVNC CARE PLAN IN RCRD: CPT | Mod: CPTII,S$GLB,,

## 2024-04-24 PROCEDURE — 1160F RVW MEDS BY RX/DR IN RCRD: CPT | Mod: CPTII,S$GLB,,

## 2024-04-24 PROCEDURE — 99999 PR PBB SHADOW E&M-EST. PATIENT-LVL III: CPT | Mod: PBBFAC,,,

## 2024-04-24 PROCEDURE — 1159F MED LIST DOCD IN RCRD: CPT | Mod: CPTII,S$GLB,,

## 2024-04-24 PROCEDURE — 3077F SYST BP >= 140 MM HG: CPT | Mod: CPTII,S$GLB,,

## 2024-04-24 PROCEDURE — 3288F FALL RISK ASSESSMENT DOCD: CPT | Mod: CPTII,S$GLB,,

## 2024-04-24 NOTE — PROGRESS NOTES
CHIEF COMPLAINT:  S/p TURP and TURBT      HISTORY OF PRESENTING ILLINESS:  Rikki Mello is a 74 y.o. male established patient of Dr. Chung. Presents today for VT s/p TURP and TURBT 4/19/2024. He also had a bladder stone removed and extraction of urolift clip. Doing well post operatively. Pathology is in process.        REVIEW OF SYSTEMS:  Review of Systems   Constitutional:  Negative for chills and fever.   HENT:  Negative for congestion and sore throat.    Respiratory:  Negative for cough and shortness of breath.    Cardiovascular:  Negative for chest pain and palpitations.   Gastrointestinal:  Negative for nausea and vomiting.   Genitourinary:  Negative for flank pain and hematuria.        Ritter catheter present   Neurological:  Negative for dizziness and headaches.         PATIENT HISTORY:  Past Medical History:   Diagnosis Date    BPH (benign prostatic hyperplasia)     General anesthetics causing adverse effect in therapeutic use     last awhile for orientation to fully comeback    Hypertension     Inguinal hernia unilateral, non-recurrent        Past Surgical History:   Procedure Laterality Date    COLONOSCOPY N/A 6/21/2018    Procedure: COLONOSCOPY;  Surgeon: Medhat Ponce MD;  Location: [facility] ([address] [place] [address]);  Service: Endoscopy;  Laterality: N/A;  miralax prep (past preps miralax can not tolerate other preps)    COLONOSCOPY N/A 10/27/2023    Procedure: COLONOSCOPY;  Surgeon: Medhat Ponce MD;  [facility]  Service: Endoscopy;  Laterality: N/A;  pt states currently not taking Eliquis-informed to call [phone] if restarts  Prep Specifications:Extended/Constipation prep  ok per Paula Damon to use to slot  9/14 ref Naina Narayanan NP, miralax extended, instr. handed to patient-  cardiology clearance fro    CYSTOSCOPY N/A 4/19/2024    Procedure: CYSTOSCOPY;  Surgeon: Lamont Chung Jr., MD;  Location: [address];  Service: Urology;  Laterality: N/A;    FOREIGN  BODY REMOVAL N/A 4/19/2024    Procedure: REMOVAL, FOREIGN BODY;  Surgeon: Lamont Chung Jr., MD;  Location: [facility] OR 1ST FLR;  Service: Urology;  Laterality: N/A;    HERNIA REPAIR      REMOVAL, CALCULUS, BLADDER N/A 4/19/2024    Procedure: REMOVAL, CALCULUS, BLADDER;  Surgeon: Lamont Chung Jr., MD;  Location: [facility] OR 1ST FLR;  Service: Urology;  Laterality: N/A;    TONSILLECTOMY      TRANSANAL RECTAL RESECTION  7/10/2018    Procedure: EXCISION, LESION, RECTUM, TRANSANAL APPROACH;  Surgeon: Medhat Ponce MD;  Location: [facility] OR 2ND FLR;  Service: Colon and Rectal;;    TRANSURETHRAL RESECTION OF BLADDER TUMOR BY BIPOLAR CAUTERY N/A 4/19/2024    Procedure: TURBT, USING BIPOLAR CAUTERY;  Surgeon: Lamont Chung Jr., MD;  Location: [facility] OR 1ST FLR;  Service: Urology;  Laterality: N/A;    TRANSURETHRAL RESECTION OF PROSTATE N/A 4/19/2024    Procedure: BIPOLAR TURP (TRANSURETHRAL RESECTION OF PROSTATE);  Surgeon: Lamont Chung Jr., MD;  Location: [facility] OR 1ST FLR;  Service: Urology;  Laterality: N/A;  90 mins    TUMOR EXCISION      rectum       No family history on file.    Social History     Socioeconomic History    Marital status:    Tobacco Use    Smoking status: Never    Smokeless tobacco: Never   Substance and Sexual Activity    Alcohol use: Yes     Comment: once a month    Drug use: No       Allergies:  Codeine    Medications:    Current Outpatient Medications:     amlodipine-benazepril (LOTREL) 10-40 mg per capsule, Take 1 capsule by mouth., Disp: , Rfl:     atorvastatin (LIPITOR) 20 MG tablet, Take 20 mg by mouth., Disp: , Rfl:     flecainide (TAMBOCOR) 50 MG Tab, Take 50 mg by mouth every 12 (twelve) hours., Disp: , Rfl:     polyethylene glycol (GLYCOLAX) 17 gram PwPk, Take by mouth., Disp: , Rfl:     sildenafil (REVATIO) 20 mg Tab, Take 100 mg by mouth., Disp: , Rfl:     sulfamethoxazole-trimethoprim 400-80mg (BACTRIM,SEPTRA) 400-80 mg per tablet, Take 1 tablet by mouth 2 (two) times daily.,  Disp: , Rfl:     sulfamethoxazole-trimethoprim 400-80mg (BACTRIM,SEPTRA) 400-80 mg per tablet, Take 1 tablet by mouth once daily. for 4 days, Disp: 4 tablet, Rfl: 0    tamsulosin (FLOMAX) 0.4 mg Cap, TAKE 1 CAPSULE (0.4 MG TOTAL) BY MOUTH 2 (TWO) TIMES DAILY.**[id number], Disp: , Rfl:     Current Facility-Administered Medications:     LIDOcaine HCl 2% urojet, , Rectal, 1 time in Clinic/HOD, Lamont Chung Jr., MD    PHYSICAL EXAMINATION:  Physical Exam  HENT:      Head: Normocephalic.      Right Ear: External ear normal.      Left Ear: External ear normal.      Nose: Nose normal.      Mouth/Throat:      Mouth: Mucous membranes are moist.   Pulmonary:      Effort: Pulmonary effort is normal. No respiratory distress.   Skin:     General: Skin is warm and dry.   Neurological:      General: No focal deficit present.      Mental Status: He is alert and oriented to person, place, and time.   Psychiatric:         Mood and Affect: Mood normal.         Behavior: Behavior normal.             Lab Results   Component Value Date    CREATININE 1.4 04/15/2024    EGFRNORACEVR 53.1 (A) 04/15/2024           IMPRESSION:  Encounter Diagnoses   Name Primary?    Benign prostatic hyperplasia, unspecified whether lower urinary tract symptoms present Yes    Retention of urine     BPH with urinary obstruction     Acute cystitis without hematuria          Assessment:       1. Benign prostatic hyperplasia, unspecified whether lower urinary tract symptoms present    2. Retention of urine    3. BPH with urinary obstruction    4. Acute cystitis without hematuria        Plan:   Voiding trial performed by Nurse Lomax.  100 ml of sterile water was instilled into bladder.  Ritter catheter was removed. Patient urinated 150 ml without difficulty. PVR 15 ml.     Voiding trial passed  Patient was instructed to drink plenty of fluids today.  Instructed patient to call at 1p.m. to give an update on urine output.  I instructed patient to return to clinic or  emergency department (if after clinic hours) to have joseph catheter put back in if unable to urinate within 5 hours of joseph catheter removal or starts to experience bladder pressure/pain, decrease flow, straining/difficulty urinating, urinary frequency.    Patient voiced understanding.    Return to clinic in 3 months for post op apt with Dr. Chung    I spent 30 minutes with the patient of which more than half was spent in direct consultation with the patient in regards to our treatment and plan.  We addressed the office findings and recent labs; any need to go ER today.   Education and recommendations of today's plan of care including home remedies and needed follow up with PCP.

## 2024-04-24 NOTE — TELEPHONE ENCOUNTER
Spoke to pt. He stated that he has urinated without any difficulty since cath removed this a.m.  Instructed to go to nearest ER if having any difficulty.  REED Faustin LPN

## 2024-04-26 LAB
COMPN STONE: NORMAL
LABORATORY COMMENT REPORT: NORMAL
SPECIMEN SOURCE: NORMAL
STONE ANALYSIS IR-IMP: NORMAL

## 2024-05-01 ENCOUNTER — TELEPHONE (OUTPATIENT)
Dept: UROLOGY | Facility: CLINIC | Age: 74
End: 2024-05-01
Payer: COMMERCIAL

## 2024-05-01 NOTE — TELEPHONE ENCOUNTER
Spoke with pt wife. Pt c/o of frequency and nocturia. I offered to see pt sooner than scheduled appt. She will check with him and let us know. She will call back if he wants to come in to the office. Otherwise, appt as planned.dr reid was present for this conversation

## 2024-05-01 NOTE — TELEPHONE ENCOUNTER
----- Message from Yolanda Donaldson LPN sent at 4/30/2024  4:39 PM CDT -----  Regarding: FW: PT Advice  Contact: [phone]    ----- Message -----  From: Gian Grijalva  Sent: 4/30/2024   4:05 PM CDT  To: Juliet CERVANTES Jr Staff  Subject: PT Advice                                        Pt wife Diana calling to speak with Milady  regarding post op.  Please call pt back to discuss more [phone]

## 2024-05-02 ENCOUNTER — OFFICE VISIT (OUTPATIENT)
Dept: UROLOGY | Facility: CLINIC | Age: 74
End: 2024-05-02
Payer: COMMERCIAL

## 2024-05-02 VITALS — WEIGHT: 190.94 LBS | HEIGHT: 70 IN | BODY MASS INDEX: 27.34 KG/M2

## 2024-05-02 DIAGNOSIS — N30.00 ACUTE CYSTITIS WITHOUT HEMATURIA: Primary | ICD-10-CM

## 2024-05-02 LAB
COMMENT: NORMAL
FINAL PATHOLOGIC DIAGNOSIS: NORMAL
GROSS: NORMAL
Lab: NORMAL

## 2024-05-02 PROCEDURE — 87077 CULTURE AEROBIC IDENTIFY: CPT | Performed by: UROLOGY

## 2024-05-02 PROCEDURE — 87186 SC STD MICRODIL/AGAR DIL: CPT | Performed by: UROLOGY

## 2024-05-02 PROCEDURE — 99999 PR PBB SHADOW E&M-EST. PATIENT-LVL III: CPT | Mod: PBBFAC,,, | Performed by: UROLOGY

## 2024-05-02 PROCEDURE — 1126F AMNT PAIN NOTED NONE PRSNT: CPT | Mod: CPTII,S$GLB,, | Performed by: UROLOGY

## 2024-05-02 PROCEDURE — 3288F FALL RISK ASSESSMENT DOCD: CPT | Mod: CPTII,S$GLB,, | Performed by: UROLOGY

## 2024-05-02 PROCEDURE — 87088 URINE BACTERIA CULTURE: CPT | Performed by: UROLOGY

## 2024-05-02 PROCEDURE — 1101F PT FALLS ASSESS-DOCD LE1/YR: CPT | Mod: CPTII,S$GLB,, | Performed by: UROLOGY

## 2024-05-02 PROCEDURE — 87086 URINE CULTURE/COLONY COUNT: CPT | Performed by: UROLOGY

## 2024-05-02 PROCEDURE — 99024 POSTOP FOLLOW-UP VISIT: CPT | Mod: S$GLB,,, | Performed by: UROLOGY

## 2024-05-02 PROCEDURE — 4010F ACE/ARB THERAPY RXD/TAKEN: CPT | Mod: CPTII,S$GLB,, | Performed by: UROLOGY

## 2024-05-02 PROCEDURE — 1157F ADVNC CARE PLAN IN RCRD: CPT | Mod: CPTII,S$GLB,, | Performed by: UROLOGY

## 2024-05-02 NOTE — PROGRESS NOTES
Subjective:       Patient ID: Rikki Mello is a 74 y.o. male.    Chief Complaint: Dysuria (Frequency and burning with urination. Some blood visible before and after urination.)    HPI patient is status post TURP he in TURP he is complaining of dysuria pathology is pending patient has a good stream but he is having some dysuria his urine shows some white cells and red cells I am going to get a urine culture on him    Past Medical History:   Diagnosis Date    BPH (benign prostatic hyperplasia)     General anesthetics causing adverse effect in therapeutic use     last awhile for orientation to fully comeback    Hypertension     Inguinal hernia unilateral, non-recurrent        Past Surgical History:   Procedure Laterality Date    COLONOSCOPY N/A 6/21/2018    Procedure: COLONOSCOPY;  Surgeon: Medhat Ponce MD;  Location: [facility] ENDO (4TH FLR);  Service: Endoscopy;  Laterality: N/A;  miralax prep (past preps miralax can not tolerate other preps)    COLONOSCOPY N/A 10/27/2023    Procedure: COLONOSCOPY;  Surgeon: Medhat Ponce MD;  Location: [facility] (4TH FLR);  Service: Endoscopy;  Laterality: N/A;  pt states currently not taking Eliquis-informed to call [phone] if restarts  Prep Specifications:Extended/Constipation prep  ok per Paula Damon to use to slot  9/14 ref Naina Narayanan NP, miralax extended, instr. handed to patient-  cardiology clearance fro    CYSTOSCOPY N/A 4/19/2024    Procedure: CYSTOSCOPY;  Surgeon: Lamont Chung Jr., MD;  Location: [facility] OR [address] [place] [address];  Service: Urology;  Laterality: N/A;    FOREIGN BODY REMOVAL N/A 4/19/2024    Procedure: REMOVAL, FOREIGN BODY;  Surgeon: Lamont Chung Jr., MD;  [facility];  Service: Urology;  Laterality: N/A;    HERNIA REPAIR      REMOVAL, CALCULUS, BLADDER N/A 4/19/2024    Procedure: REMOVAL, CALCULUS, BLADDER;  Surgeon: Lamont Chung Jr., MD;  Location: [facility] OR [address] [place] [address];  Service: Urology;  Laterality: N/A;     TONSILLECTOMY      TRANSANAL RECTAL RESECTION  7/10/2018    Procedure: EXCISION, LESION, RECTUM, TRANSANAL APPROACH;  Surgeon: Medhat Ponce MD;  Location: [facility] OR 2ND FLR;  Service: Colon and Rectal;;    TRANSURETHRAL RESECTION OF BLADDER TUMOR BY BIPOLAR CAUTERY N/A 4/19/2024    Procedure: TURBT, USING BIPOLAR CAUTERY;  Surgeon: Lamont Chung Jr., MD;  Location: [facility] OR 1ST FLR;  Service: Urology;  Laterality: N/A;    TRANSURETHRAL RESECTION OF PROSTATE N/A 4/19/2024    Procedure: BIPOLAR TURP (TRANSURETHRAL RESECTION OF PROSTATE);  Surgeon: Lamont Chung Jr., MD;  Location: [facility] OR 1ST FLR;  Service: Urology;  Laterality: N/A;  90 mins    TUMOR EXCISION      rectum       No family history on file.    Social History     Socioeconomic History    Marital status:    Tobacco Use    Smoking status: Never    Smokeless tobacco: Never   Substance and Sexual Activity    Alcohol use: Yes     Comment: once a month    Drug use: No       Allergies:  Codeine    Medications:    Current Outpatient Medications:     amlodipine-benazepril (LOTREL) 10-40 mg per capsule, Take 1 capsule by mouth., Disp: , Rfl:     flecainide (TAMBOCOR) 50 MG Tab, Take 50 mg by mouth every 12 (twelve) hours., Disp: , Rfl:     atorvastatin (LIPITOR) 20 MG tablet, Take 20 mg by mouth., Disp: , Rfl:     polyethylene glycol (GLYCOLAX) 17 gram PwPk, Take by mouth. (Patient not taking: Reported on 5/2/2024), Disp: , Rfl:     sildenafil (REVATIO) 20 mg Tab, Take 100 mg by mouth. (Patient not taking: Reported on 5/2/2024), Disp: , Rfl:     sulfamethoxazole-trimethoprim 400-80mg (BACTRIM,SEPTRA) 400-80 mg per tablet, Take 1 tablet by mouth 2 (two) times daily. (Patient not taking: Reported on 5/2/2024), Disp: , Rfl:     tamsulosin (FLOMAX) 0.4 mg Cap, TAKE 1 CAPSULE (0.4 MG TOTAL) BY MOUTH 2 (TWO) TIMES DAILY.**[id number] (Patient not taking: Reported on 5/2/2024), Disp: , Rfl:     Current Facility-Administered Medications:     LIDOcaine HCl 2%  urojet, , Rectal, 1 time in Clinic/HOD, Lamont Chung Jr., MD    Review of Systems    Objective:      Physical Exam    Assessment:       1. Acute cystitis without hematuria        Plan:       Rikki was seen today for dysuria.    Diagnoses and all orders for this visit:    Acute cystitis without hematuria  -     Urine culture; Future  -     Urine culture    Await urine culture keep appointment later this month to check symptoms and start antibiotics p.r.n.

## 2024-05-05 LAB — BACTERIA UR CULT: ABNORMAL

## 2024-05-06 LAB
OHS QRS DURATION: 98 MS
OHS QTC CALCULATION: 447 MS

## 2024-05-07 DIAGNOSIS — N30.00 ACUTE CYSTITIS WITHOUT HEMATURIA: Primary | ICD-10-CM

## 2024-05-07 RX ORDER — SULFAMETHOXAZOLE AND TRIMETHOPRIM 800; 160 MG/1; MG/1
1 TABLET ORAL 2 TIMES DAILY
Qty: 20 TABLET | Refills: 0 | Status: SHIPPED | OUTPATIENT
Start: 2024-05-07 | End: 2024-05-17

## 2024-05-07 NOTE — PROGRESS NOTES
Called patient to inform him of positive urine culture from clinic 5/2/24. This is sensitive to Bactrim, patient denies allergy and has previously taken. Prescription sent to preferred home pharmacy. Will take BID x10 days.    Also informed patient of benign pathology report from both bladder and prostate specimens from surgery. Confirmed follow up with Dr. Chung 5/22/24. No further questions at this time, patient expressed understanding and will call back with any further questions or concerns.

## 2024-05-22 ENCOUNTER — OFFICE VISIT (OUTPATIENT)
Dept: UROLOGY | Facility: CLINIC | Age: 74
End: 2024-05-22
Payer: COMMERCIAL

## 2024-05-22 VITALS
HEART RATE: 57 BPM | BODY MASS INDEX: 26.67 KG/M2 | SYSTOLIC BLOOD PRESSURE: 132 MMHG | HEIGHT: 70 IN | WEIGHT: 186.31 LBS | DIASTOLIC BLOOD PRESSURE: 6 MMHG

## 2024-05-22 DIAGNOSIS — N39.0 URINARY TRACT INFECTION WITHOUT HEMATURIA, SITE UNSPECIFIED: ICD-10-CM

## 2024-05-22 DIAGNOSIS — N40.0 BENIGN NON-NODULAR PROSTATIC HYPERPLASIA WITHOUT LOWER URINARY TRACT SYMPTOMS: Primary | ICD-10-CM

## 2024-05-22 PROCEDURE — 3288F FALL RISK ASSESSMENT DOCD: CPT | Mod: CPTII,S$GLB,, | Performed by: UROLOGY

## 2024-05-22 PROCEDURE — 87186 SC STD MICRODIL/AGAR DIL: CPT | Performed by: UROLOGY

## 2024-05-22 PROCEDURE — 3078F DIAST BP <80 MM HG: CPT | Mod: CPTII,S$GLB,, | Performed by: UROLOGY

## 2024-05-22 PROCEDURE — 87086 URINE CULTURE/COLONY COUNT: CPT | Performed by: UROLOGY

## 2024-05-22 PROCEDURE — 1157F ADVNC CARE PLAN IN RCRD: CPT | Mod: CPTII,S$GLB,, | Performed by: UROLOGY

## 2024-05-22 PROCEDURE — 3075F SYST BP GE 130 - 139MM HG: CPT | Mod: CPTII,S$GLB,, | Performed by: UROLOGY

## 2024-05-22 PROCEDURE — 4010F ACE/ARB THERAPY RXD/TAKEN: CPT | Mod: CPTII,S$GLB,, | Performed by: UROLOGY

## 2024-05-22 PROCEDURE — 1126F AMNT PAIN NOTED NONE PRSNT: CPT | Mod: CPTII,S$GLB,, | Performed by: UROLOGY

## 2024-05-22 PROCEDURE — 99999 PR PBB SHADOW E&M-EST. PATIENT-LVL III: CPT | Mod: PBBFAC,,, | Performed by: UROLOGY

## 2024-05-22 PROCEDURE — 99024 POSTOP FOLLOW-UP VISIT: CPT | Mod: S$GLB,,, | Performed by: UROLOGY

## 2024-05-22 PROCEDURE — 87088 URINE BACTERIA CULTURE: CPT | Performed by: UROLOGY

## 2024-05-22 PROCEDURE — 1159F MED LIST DOCD IN RCRD: CPT | Mod: CPTII,S$GLB,, | Performed by: UROLOGY

## 2024-05-22 PROCEDURE — 87077 CULTURE AEROBIC IDENTIFY: CPT | Performed by: UROLOGY

## 2024-05-22 PROCEDURE — 1101F PT FALLS ASSESS-DOCD LE1/YR: CPT | Mod: CPTII,S$GLB,, | Performed by: UROLOGY

## 2024-05-22 NOTE — PROGRESS NOTES
Subjective:       Patient ID: Rikki Mello is a 74 y.o. male.    Chief Complaint: Post-op Evaluation (2 week post operative s/p turp 04/19/2024)    HPI       Patient  is status post TURP pathology showed a tiny amount of bladder tumor so I will need to repeat cystoscopy in 3 months his urine may be infected I am going to send off a culture and treat this appropriately based on culture results he is happy with his symptoms  Past Medical History:   Diagnosis Date    BPH (benign prostatic hyperplasia)     General anesthetics causing adverse effect in therapeutic use     last awhile for orientation to fully comeback    Hypertension     Inguinal hernia unilateral, non-recurrent        Past Surgical History:   Procedure Laterality Date    COLONOSCOPY N/A 6/21/2018    Procedure: COLONOSCOPY;  Surgeon: Medhat Ponce MD;  Location: [facility] ENDO (4TH FLR);  Service: Endoscopy;  Laterality: N/A;  miralax prep (past preps miralax can not tolerate other preps)    COLONOSCOPY N/A 10/27/2023    Procedure: COLONOSCOPY;  Surgeon: Medhat Ponce MD;  Location: [facility] (4TH FLR);  Service: Endoscopy;  Laterality: N/A;  pt states currently not taking Eliquis-informed to call [phone] if restarts  Prep Specifications:Extended/Constipation prep  ok per Paula Damon to use to slot  9/14 ref Naina Narayanan NP, miralax extended, instr. handed to patient-  cardiology clearance fro    CYSTOSCOPY N/A 4/19/2024    Procedure: CYSTOSCOPY;  Surgeon: Lamont Chung Jr., MD;  Location: [facility] OR [address] [place] [address];  Service: Urology;  Laterality: N/A;    FOREIGN BODY REMOVAL N/A 4/19/2024    Procedure: REMOVAL, FOREIGN BODY;  Surgeon: Lamont Chung Jr., MD;  [facility];  Service: Urology;  Laterality: N/A;    HERNIA REPAIR      REMOVAL, CALCULUS, BLADDER N/A 4/19/2024    Procedure: REMOVAL, CALCULUS, BLADDER;  Surgeon: Lamont Chung Jr., MD;  Location: [facility] OR [address] [place] [address];  Service: Urology;  Laterality: N/A;     TONSILLECTOMY      TRANSANAL RECTAL RESECTION  7/10/2018    Procedure: EXCISION, LESION, RECTUM, TRANSANAL APPROACH;  Surgeon: Medhat Ponce MD;  Location: [facility] OR 2ND FLR;  Service: Colon and Rectal;;    TRANSURETHRAL RESECTION OF BLADDER TUMOR BY BIPOLAR CAUTERY N/A 4/19/2024    Procedure: TURBT, USING BIPOLAR CAUTERY;  Surgeon: Lamont Chung Jr., MD;  Location: [facility] OR 1ST FLR;  Service: Urology;  Laterality: N/A;    TRANSURETHRAL RESECTION OF PROSTATE N/A 4/19/2024    Procedure: BIPOLAR TURP (TRANSURETHRAL RESECTION OF PROSTATE);  Surgeon: Lamont Chung Jr., MD;  Location: [facility] OR 1ST FLR;  Service: Urology;  Laterality: N/A;  90 mins    TUMOR EXCISION      rectum       No family history on file.    Social History     Socioeconomic History    Marital status:    Tobacco Use    Smoking status: Never    Smokeless tobacco: Never   Substance and Sexual Activity    Alcohol use: Yes     Comment: once a month    Drug use: No       Allergies:  Codeine    Medications:    Current Outpatient Medications:     amlodipine-benazepril (LOTREL) 10-40 mg per capsule, Take 1 capsule by mouth., Disp: , Rfl:     flecainide (TAMBOCOR) 50 MG Tab, Take 50 mg by mouth every 12 (twelve) hours., Disp: , Rfl:     polyethylene glycol (GLYCOLAX) 17 gram PwPk, Take by mouth., Disp: , Rfl:     sildenafil (REVATIO) 20 mg Tab, Take 100 mg by mouth., Disp: , Rfl:     atorvastatin (LIPITOR) 20 MG tablet, Take 20 mg by mouth., Disp: , Rfl:     tamsulosin (FLOMAX) 0.4 mg Cap, TAKE 1 CAPSULE (0.4 MG TOTAL) BY MOUTH 2 (TWO) TIMES DAILY.**[id number] (Patient not taking: Reported on 5/2/2024), Disp: , Rfl:     Current Facility-Administered Medications:     LIDOcaine HCl 2% urojet, , Rectal, 1 time in Clinic/HOD, Lamont Chung Jr., MD    Review of Systems    Objective:      Physical Exam    Assessment:       1. Benign non-nodular prostatic hyperplasia without lower urinary tract symptoms        Plan:       Rikki was seen today for  post-op evaluation.    Diagnoses and all orders for this visit:    Benign non-nodular prostatic hyperplasia without lower urinary tract symptoms     Await culture and see him back in 2 months entry p.r.n. positive culture

## 2024-05-24 LAB — BACTERIA UR CULT: ABNORMAL

## 2024-06-07 ENCOUNTER — LAB VISIT (OUTPATIENT)
Dept: LAB | Facility: HOSPITAL | Age: 74
End: 2024-06-07
Attending: INTERNAL MEDICINE
Payer: COMMERCIAL

## 2024-06-07 ENCOUNTER — OFFICE VISIT (OUTPATIENT)
Dept: INFECTIOUS DISEASES | Facility: CLINIC | Age: 74
End: 2024-06-07
Payer: COMMERCIAL

## 2024-06-07 VITALS
SYSTOLIC BLOOD PRESSURE: 144 MMHG | HEART RATE: 50 BPM | HEIGHT: 70 IN | WEIGHT: 183.63 LBS | BODY MASS INDEX: 26.29 KG/M2 | TEMPERATURE: 98 F | DIASTOLIC BLOOD PRESSURE: 70 MMHG

## 2024-06-07 DIAGNOSIS — R82.71 ASYMPTOMATIC BACTERIURIA: ICD-10-CM

## 2024-06-07 DIAGNOSIS — R82.71 ASYMPTOMATIC BACTERIURIA: Primary | ICD-10-CM

## 2024-06-07 PROCEDURE — 99205 OFFICE O/P NEW HI 60 MIN: CPT | Mod: S$GLB,,, | Performed by: INTERNAL MEDICINE

## 2024-06-07 PROCEDURE — 1126F AMNT PAIN NOTED NONE PRSNT: CPT | Mod: CPTII,S$GLB,, | Performed by: INTERNAL MEDICINE

## 2024-06-07 PROCEDURE — 3077F SYST BP >= 140 MM HG: CPT | Mod: CPTII,S$GLB,, | Performed by: INTERNAL MEDICINE

## 2024-06-07 PROCEDURE — 3008F BODY MASS INDEX DOCD: CPT | Mod: CPTII,S$GLB,, | Performed by: INTERNAL MEDICINE

## 2024-06-07 PROCEDURE — 3288F FALL RISK ASSESSMENT DOCD: CPT | Mod: CPTII,S$GLB,, | Performed by: INTERNAL MEDICINE

## 2024-06-07 PROCEDURE — 1159F MED LIST DOCD IN RCRD: CPT | Mod: CPTII,S$GLB,, | Performed by: INTERNAL MEDICINE

## 2024-06-07 PROCEDURE — 1157F ADVNC CARE PLAN IN RCRD: CPT | Mod: CPTII,S$GLB,, | Performed by: INTERNAL MEDICINE

## 2024-06-07 PROCEDURE — 1101F PT FALLS ASSESS-DOCD LE1/YR: CPT | Mod: CPTII,S$GLB,, | Performed by: INTERNAL MEDICINE

## 2024-06-07 PROCEDURE — 87040 BLOOD CULTURE FOR BACTERIA: CPT | Performed by: INTERNAL MEDICINE

## 2024-06-07 PROCEDURE — 3078F DIAST BP <80 MM HG: CPT | Mod: CPTII,S$GLB,, | Performed by: INTERNAL MEDICINE

## 2024-06-07 PROCEDURE — 4010F ACE/ARB THERAPY RXD/TAKEN: CPT | Mod: CPTII,S$GLB,, | Performed by: INTERNAL MEDICINE

## 2024-06-07 PROCEDURE — 99999 PR PBB SHADOW E&M-EST. PATIENT-LVL III: CPT | Mod: PBBFAC,,, | Performed by: INTERNAL MEDICINE

## 2024-06-07 NOTE — PROGRESS NOTES
Infectious Diseases Clinic Note    Subjective:       Patient ID: Rikki Mello is a 74 y.o. male.    Chief Complaint: No chief complaint on file.    HPI    75 y/o M h/o urolyift 5 years ago, began to have hematuria, then had ureteral stent then joseph catheter in place x 10 months, then removed joseph and was unable to urinate, incidental bladder mass s/p TURP  4/19/2024. 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 He reports being asymptomatic the entire time    Last CT a/p was 11/2023    Past Medical History:   Diagnosis Date    BPH (benign prostatic hyperplasia)     General anesthetics causing adverse effect in therapeutic use     last awhile for orientation to fully comeback    Hypertension     Inguinal hernia unilateral, non-recurrent        Social History     Socioeconomic History    Marital status:    Tobacco Use    Smoking status: Never    Smokeless tobacco: Never   Substance and Sexual Activity    Alcohol use: Yes     Comment: once a month    Drug use: No         Current Outpatient Medications:     amlodipine-benazepril (LOTREL) 10-40 mg per capsule, Take 1 capsule by mouth., Disp: , Rfl:     atorvastatin (LIPITOR) 20 MG tablet, Take 20 mg by mouth., Disp: , Rfl:     flecainide (TAMBOCOR) 50 MG Tab, Take 50 mg by mouth every 12 (twelve) hours., Disp: , Rfl:     polyethylene glycol (GLYCOLAX) 17 gram PwPk, Take by mouth., Disp: , Rfl:     sildenafil (REVATIO) 20 mg Tab, Take 100 mg by mouth., Disp: , Rfl:     tamsulosin (FLOMAX) 0.4 mg Cap, TAKE 1 CAPSULE (0.4 MG TOTAL) BY MOUTH 2 (TWO) TIMES DAILY.**[id number] (Patient not taking: Reported on 5/2/2024), Disp: , Rfl:     Current Facility-Administered Medications:     LIDOcaine HCl 2% urojet, , Rectal, 1 time in Clinic/HOD, Lamont Chung Jr., MD    Review of Systems   All other systems reviewed and are negative.        Past Surgical  History:   Procedure Laterality Date    COLONOSCOPY N/A 6/21/2018    Procedure: COLONOSCOPY;  Surgeon: Medhat Ponce MD;  Location: [facility] ENDO (4TH FLR);  Service: Endoscopy;  Laterality: N/A;  miralax prep (past preps miralax can not tolerate other preps)    COLONOSCOPY N/A 10/27/2023    Procedure: COLONOSCOPY;  Surgeon: Medhat Ponce MD;  Location: [facility] ENDO (4TH FLR);  Service: Endoscopy;  Laterality: N/A;  pt states currently not taking Eliquis-informed to call [phone] if restarts  Prep Specifications:Extended/Constipation prep  ok per Paula Damon to use to slot  9/14 ref Naina Narayanan NP, miralax extended, instr. handed to [facility] clearance fro    CYSTOSCOPY N/A 4/19/2024    Procedure: CYSTOSCOPY;  Surgeon: Lamont Chung Jr., MD;  Location: [facility] OR 1ST FLR;  Service: Urology;  Laterality: N/A;    FOREIGN BODY REMOVAL N/A 4/19/2024    Procedure: REMOVAL, FOREIGN BODY;  Surgeon: Lamont Chung Jr., MD;  Location: [facility] OR 1ST FLR;  Service: Urology;  Laterality: N/A;    HERNIA REPAIR      REMOVAL, CALCULUS, BLADDER N/A 4/19/2024    Procedure: REMOVAL, CALCULUS, BLADDER;  Surgeon: Lamont Chung Jr., MD;  [facility];  Service: Urology;  Laterality: N/A;    TONSILLECTOMY      TRANSANAL RECTAL RESECTION  7/10/2018    Procedure: EXCISION, LESION, RECTUM, TRANSANAL APPROACH;  Surgeon: Medhat Ponce MD;  Location: [facility] OR 2ND FLR;  Service: Colon and Rectal;;    TRANSURETHRAL RESECTION OF BLADDER TUMOR BY BIPOLAR CAUTERY N/A 4/19/2024    Procedure: TURBT, USING BIPOLAR CAUTERY;  Surgeon: Lamont Chung Jr., MD;  Location: [facility] OR 1ST FLR;  Service: Urology;  Laterality: N/A;    TRANSURETHRAL RESECTION OF PROSTATE N/A 4/19/2024    Procedure: BIPOLAR TURP (TRANSURETHRAL RESECTION OF PROSTATE);  Surgeon: Lamont Chung Jr., MD;  Location: [facility] OR [address] [place] [address];  Service: Urology;  Laterality: N/A;  90 mins    TUMOR EXCISION      rectum        Reviewed records  today as well as relevant labs, cultures, and imaging    Extremely medically complex    Objective:      There were no vitals filed for this visit.  Physical Exam  Constitutional:       Appearance: He is well-developed.   HENT:      Head: Normocephalic and atraumatic.   Eyes:      Conjunctiva/sclera: Conjunctivae normal.   Cardiovascular:      Rate and Rhythm: Normal rate and regular rhythm.      Heart sounds: Normal heart sounds. No murmur heard.  Pulmonary:      Effort: Pulmonary effort is normal. No respiratory distress.      Breath sounds: Normal breath sounds. No wheezing.   Abdominal:      General: Bowel sounds are normal. There is no distension.      Palpations: Abdomen is soft.      Tenderness: There is no abdominal tenderness.   Musculoskeletal:         General: No tenderness. Normal range of motion.      Cervical back: Neck supple.   Lymphadenopathy:      Cervical: No cervical adenopathy.   Skin:     General: Skin is warm and dry.      Findings: No rash.   Neurological:      Mental Status: He is alert and oriented to person, place, and time.      Coordination: Coordination normal.   Psychiatric:         Behavior: Behavior normal.             Assessment/Plan:          75 y/o M h/o urolyift 5 years ago, began to have hematuria, then had ureteral stent then joseph catheter in place x 10 months, then removed joseph and was unable to urinate, incidental bladder mass s/p TURP  4/19/2024. 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 He reports being asymptomatic the entire time  - suspect MRSA is colonizing stones given recurrent asymptomatic bacteriuria  - no indication for antibiotic treatment at this time given asymptomatic but patient will have low threshold to seek care if any changes in how he is feeling or LUTS  - will check Blood cultures for completeness  - Needs MRSA active agents ryan-  procedure - either tmpsmx or linezolid  Would ensure low to normal PVR optimize    Discussed care with  team/provider

## 2024-06-07 NOTE — Clinical Note
Hi Dr. Chung, I assume some structural (stones) or functional (not sure if incomplete bladder emptying) is causing recurrent asymptomatic bacteriuria that will unlikely be cleared with antibiotics alone.  I know you are going to do cystoscopy soon which is great.  Any attempt to optimize stone burden and ensure bladder emptying will be great.  He definitely needs ryan-procedural cultures and treatment with bactrim or linezolid when undergoing invasive  procedures.

## 2024-06-12 LAB
BACTERIA BLD CULT: NORMAL
BACTERIA BLD CULT: NORMAL

## 2024-07-22 ENCOUNTER — OFFICE VISIT (OUTPATIENT)
Dept: UROLOGY | Facility: CLINIC | Age: 74
End: 2024-07-22
Payer: COMMERCIAL

## 2024-07-22 VITALS
BODY MASS INDEX: 26.2 KG/M2 | HEIGHT: 70 IN | DIASTOLIC BLOOD PRESSURE: 70 MMHG | HEART RATE: 50 BPM | WEIGHT: 183 LBS | SYSTOLIC BLOOD PRESSURE: 144 MMHG

## 2024-07-22 DIAGNOSIS — Z98.890 POST-OPERATIVE STATE: Primary | ICD-10-CM

## 2024-07-22 PROCEDURE — 99024 POSTOP FOLLOW-UP VISIT: CPT | Mod: S$GLB,,, | Performed by: UROLOGY

## 2024-07-22 PROCEDURE — 87077 CULTURE AEROBIC IDENTIFY: CPT | Performed by: UROLOGY

## 2024-07-22 PROCEDURE — 87186 SC STD MICRODIL/AGAR DIL: CPT | Performed by: UROLOGY

## 2024-07-22 PROCEDURE — 4010F ACE/ARB THERAPY RXD/TAKEN: CPT | Mod: CPTII,S$GLB,, | Performed by: UROLOGY

## 2024-07-22 PROCEDURE — 87086 URINE CULTURE/COLONY COUNT: CPT | Performed by: UROLOGY

## 2024-07-22 PROCEDURE — 3078F DIAST BP <80 MM HG: CPT | Mod: CPTII,S$GLB,, | Performed by: UROLOGY

## 2024-07-22 PROCEDURE — 87088 URINE BACTERIA CULTURE: CPT | Performed by: UROLOGY

## 2024-07-22 PROCEDURE — 3288F FALL RISK ASSESSMENT DOCD: CPT | Mod: CPTII,S$GLB,, | Performed by: UROLOGY

## 2024-07-22 PROCEDURE — 1101F PT FALLS ASSESS-DOCD LE1/YR: CPT | Mod: CPTII,S$GLB,, | Performed by: UROLOGY

## 2024-07-22 PROCEDURE — 1126F AMNT PAIN NOTED NONE PRSNT: CPT | Mod: CPTII,S$GLB,, | Performed by: UROLOGY

## 2024-07-22 PROCEDURE — 1157F ADVNC CARE PLAN IN RCRD: CPT | Mod: CPTII,S$GLB,, | Performed by: UROLOGY

## 2024-07-22 PROCEDURE — 3077F SYST BP >= 140 MM HG: CPT | Mod: CPTII,S$GLB,, | Performed by: UROLOGY

## 2024-07-22 PROCEDURE — 1159F MED LIST DOCD IN RCRD: CPT | Mod: CPTII,S$GLB,, | Performed by: UROLOGY

## 2024-07-22 PROCEDURE — 99999 PR PBB SHADOW E&M-EST. PATIENT-LVL III: CPT | Mod: PBBFAC,,, | Performed by: UROLOGY

## 2024-07-22 NOTE — PROGRESS NOTES
Nora Willett   Preferred Name:   None  Female, 51 year old, 1966  Preferred Phone:   [phone]  Last Weight:   66.7 kg  PCP:   Gracia Vieyra MD  Need Interp:   No  Language:   English  Allergies  No Known Allergies  Primary Ins:   [employer] M  MRN:   [id number]  [facility]:   Declined  Next Appt With Me:   None  RX   Received: Today   Message Contents   Ofelia HU [facility] Nurse Review Pool Cc: Ofelia Guajardo             Patient is scheduled for a colonoscopy with Dr FRENCH on 9/29/17.   Please send Nulytely prep to [facility] on  [address].         Thank you         Subjective:       Patient ID: Rikki Mello is a 74 y.o. male.    Chief Complaint: Post-op Evaluation (Pt here for post op w/ Dr. Chung. Pt reports voiding better post proc. )      Patient is status post TURP his urine today shows some white cells and going to get a urine culture and we will start treating 10 days prior to his cystoscopy he had small bladder tumor and we need to look back in his bladder to see what is going on with that.  He is satisfied with his voiding    Past Medical History:   Diagnosis Date    BPH (benign prostatic hyperplasia)     General anesthetics causing adverse effect in therapeutic use     last awhile for orientation to fully comeback    Hypertension     Inguinal hernia unilateral, non-recurrent        Past Surgical History:   Procedure Laterality Date    COLONOSCOPY N/A 6/21/2018    Procedure: COLONOSCOPY;  Surgeon: Medhat Ponce MD;  Location: [facility] (4TH FLR);  Service: Endoscopy;  Laterality: N/A;  miralax prep (past preps miralax can not tolerate other preps)    COLONOSCOPY N/A 10/27/2023    Procedure: COLONOSCOPY;  Surgeon: Medhat Ponce MD;  Location: [facility] (4TH FLR);  Service: Endoscopy;  Laterality: N/A;  pt states currently not taking Eliquis-informed to call [phone] if restarts  Prep Specifications:Extended/Constipation prep  ok per Paula Damon to use to slot  9/14 ref Naina Narayanan NP, miralax extended, instr. handed to patient-  cardiology clearance fro    CYSTOSCOPY N/A 4/19/2024    Procedure: CYSTOSCOPY;  Surgeon: Lamont Chung Jr., MD;  Location: [facility] OR [address] [place] [address];  Service: Urology;  Laterality: N/A;    FOREIGN BODY REMOVAL N/A 4/19/2024    Procedure: REMOVAL, FOREIGN BODY;  Surgeon: Lamont Chung Jr., MD;  Location: [facility] OR [address] [place] [address];  Service: Urology;  Laterality: N/A;    HERNIA REPAIR      REMOVAL, CALCULUS, BLADDER N/A 4/19/2024    Procedure: REMOVAL, CALCULUS, BLADDER;  Surgeon: Lamont Chung Jr., MD;  Location: [facility] OR [facility]  aspirin 81 MG tablet      Last Written Prescription Date: 3/7/17  Last Quantity: 90, # refills: 1  [facility] prescribing provider: 3/7/17       Creatinine   Date Value Ref Range Status   02/24/2017 0.97 0.52 - 1.04 mg/dL Final     Lab Results   Component Value Date    AST 27 06/19/2013     Lab Results   Component Value Date    ALT 34 06/19/2013     BP Readings from Last 3 Encounters:   03/07/17 138/70   02/24/17 126/84   01/27/16 134/72     cholecalciferol (VITAMIN D) 1000 UNIT tablet      Last Written Prescription Date: 1/27/16  Last Fill Quantity: 90,  # refills: 0   Last Office Visit [facility] prescribing provider: 3/7/16                                                [facility];  Service: Urology;  Laterality: N/A;    TONSILLECTOMY      TRANSANAL RECTAL RESECTION  7/10/2018    Procedure: EXCISION, LESION, RECTUM, TRANSANAL APPROACH;  Surgeon: Medhat Ponce MD;  Location: [facility] OR 2ND FLR;  Service: Colon and Rectal;;    TRANSURETHRAL RESECTION OF BLADDER TUMOR BY BIPOLAR CAUTERY N/A 4/19/2024    Procedure: TURBT, USING BIPOLAR CAUTERY;  Surgeon: Lamont Chung Jr., MD;  Location: [facility] OR 1ST FLR;  Service: Urology;  Laterality: N/A;    TRANSURETHRAL RESECTION OF PROSTATE N/A 4/19/2024    Procedure: BIPOLAR TURP (TRANSURETHRAL RESECTION OF PROSTATE);  Surgeon: Lamont Chung Jr., MD;  Location: [facility] OR 1ST FLR;  Service: Urology;  Laterality: N/A;  90 mins    TUMOR EXCISION      rectum       No family history on file.    Social History     Socioeconomic History    Marital status:    Tobacco Use    Smoking status: Never    Smokeless tobacco: Never   Substance and Sexual Activity    Alcohol use: Yes     Comment: once a month    Drug use: No       Allergies:  Codeine    Medications:    Current Outpatient Medications:     amlodipine-benazepril (LOTREL) 10-40 mg per capsule, Take 1 capsule by mouth., Disp: , Rfl:     atorvastatin (LIPITOR) 20 MG tablet, Take 20 mg by mouth., Disp: , Rfl:     flecainide (TAMBOCOR) 50 MG Tab, Take 50 mg by mouth every 12 (twelve) hours., Disp: , Rfl:     polyethylene glycol (GLYCOLAX) 17 gram PwPk, Take by mouth., Disp: , Rfl:     sildenafil (REVATIO) 20 mg Tab, Take 100 mg by mouth., Disp: , Rfl:     tamsulosin (FLOMAX) 0.4 mg Cap, , Disp: , Rfl:     Current Facility-Administered Medications:     LIDOcaine HCl 2% urojet, , Rectal, 1 time in Clinic/HOD, Lamont Chung Jr., MD    Review of Systems    Objective:      Physical Exam    Assessment:       1. Post-operative state        Plan:       Rikki was seen today for post-op evaluation.    Diagnoses and all orders for this visit:    Post-operative state  -     Urine culture    Will send a  culture today and his cysto is on the 20th of August he will start taking and will do TUR be T after that as needed antibiotics namely Bactrim just prior to

## 2024-07-24 LAB — BACTERIA UR CULT: ABNORMAL

## 2024-08-20 ENCOUNTER — PROCEDURE VISIT (OUTPATIENT)
Dept: UROLOGY | Facility: CLINIC | Age: 74
End: 2024-08-20
Payer: COMMERCIAL

## 2024-08-20 VITALS
WEIGHT: 183 LBS | RESPIRATION RATE: 18 BRPM | HEIGHT: 70 IN | DIASTOLIC BLOOD PRESSURE: 65 MMHG | TEMPERATURE: 98 F | BODY MASS INDEX: 26.2 KG/M2 | HEART RATE: 53 BPM | SYSTOLIC BLOOD PRESSURE: 139 MMHG

## 2024-08-20 DIAGNOSIS — N40.0 BENIGN NON-NODULAR PROSTATIC HYPERPLASIA WITHOUT LOWER URINARY TRACT SYMPTOMS: ICD-10-CM

## 2024-08-20 RX ORDER — LIDOCAINE HYDROCHLORIDE 20 MG/ML
JELLY TOPICAL ONCE
Status: COMPLETED | OUTPATIENT
Start: 2024-08-20 | End: 2024-08-20

## 2024-08-20 RX ADMIN — LIDOCAINE HYDROCHLORIDE 5 ML: 20 JELLY TOPICAL at 12:08

## 2024-08-20 NOTE — PROCEDURES
Cystoscopy    Date/Time: 8/20/2024 12:30 PM    Performed by: Lamont Chung Jr., MD  Authorized by: Lamont Chung Jr., MD    Consent Done?:  Yes (Written)  Prep: patient was prepped and draped in usual sterile fashion    Local anesthesia used?: No    Anesthesia:  Lidocaine jelly  Anesthesia:  Lidocaine jelly  Preparation: Patient was prepped and draped in usual sterile fashion    Scope type:  Flexible cystoscope  Prostate normal: open  s/p turp   No bladder.    Bladder neck normal: Yes    Bladder normal: Yes    Comments:      No BT  Cysto 1 yr

## 2024-08-20 NOTE — PATIENT INSTRUCTIONS
What to Expect After a Cystoscopy  For the next 24-48 hours, you may feel a mild burning when you urinate. This burning is normal and expected. Drink plenty of water to dilute the urine to help relieve the burning sensation. You may also see a small amount of blood in your urine after the procedure.    Unless you are already taking antibiotics, you may be given an antibiotic after the test to prevent infection.    Signs and Symptoms to Report  Call the [facility] at [phone] if you develop any of the following:  Fever of 101 degrees or higher  Chills or persistent bleeding  Inability to urinate

## 2025-01-30 ENCOUNTER — TELEPHONE (OUTPATIENT)
Dept: UROLOGY | Facility: CLINIC | Age: 75
End: 2025-01-30
Payer: COMMERCIAL

## 2025-08-20 ENCOUNTER — OFFICE VISIT (OUTPATIENT)
Dept: UROLOGY | Facility: CLINIC | Age: 75
End: 2025-08-20
Payer: MEDICARE

## 2025-08-20 VITALS
SYSTOLIC BLOOD PRESSURE: 147 MMHG | HEART RATE: 52 BPM | BODY MASS INDEX: 27.4 KG/M2 | HEIGHT: 71 IN | DIASTOLIC BLOOD PRESSURE: 64 MMHG | WEIGHT: 195.75 LBS

## 2025-08-20 DIAGNOSIS — N40.0 BENIGN NON-NODULAR PROSTATIC HYPERPLASIA WITHOUT LOWER URINARY TRACT SYMPTOMS: Primary | ICD-10-CM

## 2025-08-20 DIAGNOSIS — N39.0 URINARY TRACT INFECTION WITHOUT HEMATURIA, SITE UNSPECIFIED: ICD-10-CM

## 2025-08-20 LAB
BILIRUBIN, UA POC OHS: NEGATIVE
BLOOD, UA POC OHS: ABNORMAL
CLARITY, UA POC OHS: CLEAR
COLOR, UA POC OHS: YELLOW
GLUCOSE, UA POC OHS: NEGATIVE
KETONES, UA POC OHS: NEGATIVE
LEUKOCYTES, UA POC OHS: NEGATIVE
NITRITE, UA POC OHS: POSITIVE
PH, UA POC OHS: 6
PROTEIN, UA POC OHS: NEGATIVE
SPECIFIC GRAVITY, UA POC OHS: 1.02
UROBILINOGEN, UA POC OHS: 0.2

## 2025-08-20 PROCEDURE — 99213 OFFICE O/P EST LOW 20 MIN: CPT | Mod: PBBFAC

## 2025-08-20 PROCEDURE — 99999PBSHW POCT URINALYSIS(INSTRUMENT): Mod: PBBFAC,,,

## 2025-08-20 PROCEDURE — 87186 SC STD MICRODIL/AGAR DIL: CPT

## 2025-08-20 PROCEDURE — G2211 COMPLEX E/M VISIT ADD ON: HCPCS | Mod: ,,,

## 2025-08-20 PROCEDURE — 99214 OFFICE O/P EST MOD 30 MIN: CPT | Mod: S$PBB,,,

## 2025-08-20 PROCEDURE — 81003 URINALYSIS AUTO W/O SCOPE: CPT | Mod: PBBFAC

## 2025-08-20 PROCEDURE — 99999 PR PBB SHADOW E&M-EST. PATIENT-LVL III: CPT | Mod: PBBFAC,,,

## 2025-08-20 RX ORDER — LIDOCAINE HYDROCHLORIDE 20 MG/ML
JELLY TOPICAL ONCE
OUTPATIENT
Start: 2025-08-20 | End: 2025-08-20

## 2025-08-23 LAB — BACTERIA UR CULT: ABNORMAL

## 2025-08-25 DIAGNOSIS — N30.00 ACUTE CYSTITIS WITHOUT HEMATURIA: Primary | ICD-10-CM

## 2025-08-25 RX ORDER — SULFAMETHOXAZOLE AND TRIMETHOPRIM 800; 160 MG/1; MG/1
1 TABLET ORAL 2 TIMES DAILY
Qty: 28 TABLET | Refills: 0 | Status: SHIPPED | OUTPATIENT
Start: 2025-08-25 | End: 2025-09-08

## 2025-08-26 ENCOUNTER — TELEPHONE (OUTPATIENT)
Dept: UROLOGY | Facility: CLINIC | Age: 75
End: 2025-08-26
Payer: MEDICARE

## 2025-08-27 ENCOUNTER — PROCEDURE VISIT (OUTPATIENT)
Dept: UROLOGY | Facility: CLINIC | Age: 75
End: 2025-08-27
Payer: MEDICARE

## 2025-08-27 ENCOUNTER — TELEPHONE (OUTPATIENT)
Dept: UROLOGY | Facility: CLINIC | Age: 75
End: 2025-08-27
Payer: MEDICARE

## 2025-08-27 VITALS — BODY MASS INDEX: 26.4 KG/M2 | TEMPERATURE: 98 F | WEIGHT: 187.38 LBS

## 2025-08-27 DIAGNOSIS — N39.0 URINARY TRACT INFECTION WITHOUT HEMATURIA, SITE UNSPECIFIED: ICD-10-CM

## 2025-08-27 DIAGNOSIS — N40.0 BENIGN NON-NODULAR PROSTATIC HYPERPLASIA WITHOUT LOWER URINARY TRACT SYMPTOMS: ICD-10-CM

## 2025-08-27 DIAGNOSIS — N30.00 ACUTE CYSTITIS WITHOUT HEMATURIA: Primary | ICD-10-CM

## 2025-08-27 RX ORDER — SULFAMETHOXAZOLE AND TRIMETHOPRIM 800; 160 MG/1; MG/1
1 TABLET ORAL
Status: COMPLETED | OUTPATIENT
Start: 2025-08-27 | End: 2025-08-27

## 2025-08-27 RX ADMIN — SULFAMETHOXAZOLE AND TRIMETHOPRIM 1 TABLET: 800; 160 TABLET ORAL at 10:08

## 2025-09-02 ENCOUNTER — TELEPHONE (OUTPATIENT)
Dept: UROLOGY | Facility: CLINIC | Age: 75
End: 2025-09-02
Payer: MEDICARE

## 2025-09-03 ENCOUNTER — PROCEDURE VISIT (OUTPATIENT)
Dept: UROLOGY | Facility: CLINIC | Age: 75
End: 2025-09-03
Payer: MEDICARE

## 2025-09-03 VITALS
RESPIRATION RATE: 18 BRPM | BODY MASS INDEX: 27.42 KG/M2 | WEIGHT: 194.69 LBS | TEMPERATURE: 98 F | DIASTOLIC BLOOD PRESSURE: 56 MMHG | HEART RATE: 54 BPM | SYSTOLIC BLOOD PRESSURE: 146 MMHG

## 2025-09-03 DIAGNOSIS — N40.1 BPH WITH URINARY OBSTRUCTION: Primary | ICD-10-CM

## 2025-09-03 DIAGNOSIS — N13.8 BPH WITH URINARY OBSTRUCTION: Primary | ICD-10-CM

## 2025-09-03 PROCEDURE — 52000 CYSTOURETHROSCOPY: CPT | Mod: PBBFAC | Performed by: UROLOGY

## 2025-09-03 RX ADMIN — LIDOCAINE HYDROCHLORIDE 5 ML: 20 JELLY TOPICAL at 09:09

## (undated) DEVICE — SYR 10CC LUER LOCK

## (undated) DEVICE — TRAY CYSTO BASIN OMC

## (undated) DEVICE — SYR ONLY LUER LOCK 20CC

## (undated) DEVICE — ADAPTER HOSE 10FT 8MM

## (undated) DEVICE — SOL NACL IRR 3000ML

## (undated) DEVICE — LOOP CUTTING BPLR 24/26F .30MM

## (undated) DEVICE — TRAY MINOR GEN SURG

## (undated) DEVICE — SEE MEDLINE ITEM 154981

## (undated) DEVICE — BOWL UTILITY BLUE 32OZ

## (undated) DEVICE — SUT VLOC 180 2-0 GS-22 NDL

## (undated) DEVICE — SYR 50ML CATH TIP

## (undated) DEVICE — UNDERGLOVES BIOGEL PI SZ 7 LF

## (undated) DEVICE — SEE MEDLINE ITEM 157181

## (undated) DEVICE — TUBING SUCTION SET ENDOMAT

## (undated) DEVICE — PACK CYSTOSCOPY III SIRUS

## (undated) DEVICE — BLADE ELECTRO EDGE INSULATED

## (undated) DEVICE — HOOK STAY ELAS 5MM 8EA/PK

## (undated) DEVICE — SUT ETHILON 2-0 PSLX 30IN

## (undated) DEVICE — GAUZE VASELINE PETRO 3X9

## (undated) DEVICE — ELECTRODE REM PLYHSV RETURN 9

## (undated) DEVICE — KIT ANTIFOG

## (undated) DEVICE — RETRACTOR STERILE PLASTIC G2

## (undated) DEVICE — SOL NS 1000CC

## (undated) DEVICE — LUBRICANT SURGILUBE 2 OZ

## (undated) DEVICE — SET TRI-LUMEN FILTERED TUBE

## (undated) DEVICE — UNDERGLOVES BIOGEL PI SIZE 7.5

## (undated) DEVICE — SUT V-LOC 2.0 GS-21 90 DAY

## (undated) DEVICE — CATH STATLOCK MULTI-PURPOS

## (undated) DEVICE — HEMOSTAT SURGICEL 4X8IN

## (undated) DEVICE — SEALS

## (undated) DEVICE — SUT CTD VICRYL BR CR/SH VIL

## (undated) DEVICE — BAG URINARY DRAINAGE 2000ML

## (undated) DEVICE — WARMER DRAPE STERILE LF

## (undated) DEVICE — NDL 22GA X1 1/2 REG BEVEL

## (undated) DEVICE — GAUZE SPONGE 4X4 12PLY

## (undated) DEVICE — Device

## (undated) DEVICE — GOWN SMART IMP BREATHABLE XXLG